# Patient Record
Sex: MALE | Race: BLACK OR AFRICAN AMERICAN | NOT HISPANIC OR LATINO
[De-identification: names, ages, dates, MRNs, and addresses within clinical notes are randomized per-mention and may not be internally consistent; named-entity substitution may affect disease eponyms.]

---

## 2017-01-16 ENCOUNTER — APPOINTMENT (OUTPATIENT)
Dept: INTERNAL MEDICINE | Facility: CLINIC | Age: 47
End: 2017-01-16

## 2017-01-25 ENCOUNTER — APPOINTMENT (OUTPATIENT)
Dept: INTERNAL MEDICINE | Facility: CLINIC | Age: 47
End: 2017-01-25

## 2017-01-25 VITALS
HEIGHT: 69 IN | OXYGEN SATURATION: 98 % | HEART RATE: 91 BPM | BODY MASS INDEX: 25.18 KG/M2 | DIASTOLIC BLOOD PRESSURE: 80 MMHG | SYSTOLIC BLOOD PRESSURE: 120 MMHG | WEIGHT: 170 LBS

## 2017-02-08 ENCOUNTER — MEDICATION RENEWAL (OUTPATIENT)
Age: 47
End: 2017-02-08

## 2017-02-11 ENCOUNTER — TRANSCRIPTION ENCOUNTER (OUTPATIENT)
Age: 47
End: 2017-02-11

## 2017-02-17 ENCOUNTER — RX RENEWAL (OUTPATIENT)
Age: 47
End: 2017-02-17

## 2017-02-21 ENCOUNTER — MED ADMIN CHARGE (OUTPATIENT)
Age: 47
End: 2017-02-21

## 2017-02-21 ENCOUNTER — MEDICATION RENEWAL (OUTPATIENT)
Age: 47
End: 2017-02-21

## 2017-03-03 ENCOUNTER — MEDICATION RENEWAL (OUTPATIENT)
Age: 47
End: 2017-03-03

## 2017-03-03 ENCOUNTER — TRANSCRIPTION ENCOUNTER (OUTPATIENT)
Age: 47
End: 2017-03-03

## 2017-03-10 ENCOUNTER — APPOINTMENT (OUTPATIENT)
Dept: INTERNAL MEDICINE | Facility: CLINIC | Age: 47
End: 2017-03-10

## 2017-03-31 ENCOUNTER — APPOINTMENT (OUTPATIENT)
Dept: INTERNAL MEDICINE | Facility: CLINIC | Age: 47
End: 2017-03-31

## 2017-03-31 VITALS
WEIGHT: 176 LBS | DIASTOLIC BLOOD PRESSURE: 80 MMHG | TEMPERATURE: 98 F | BODY MASS INDEX: 26.07 KG/M2 | SYSTOLIC BLOOD PRESSURE: 110 MMHG | HEART RATE: 82 BPM | HEIGHT: 69 IN | OXYGEN SATURATION: 98 %

## 2017-04-17 ENCOUNTER — RX RENEWAL (OUTPATIENT)
Age: 47
End: 2017-04-17

## 2017-04-28 ENCOUNTER — APPOINTMENT (OUTPATIENT)
Dept: INTERNAL MEDICINE | Facility: CLINIC | Age: 47
End: 2017-04-28

## 2017-04-28 VITALS
DIASTOLIC BLOOD PRESSURE: 70 MMHG | OXYGEN SATURATION: 97 % | WEIGHT: 174 LBS | SYSTOLIC BLOOD PRESSURE: 110 MMHG | HEIGHT: 69 IN | BODY MASS INDEX: 25.77 KG/M2 | HEART RATE: 83 BPM

## 2017-04-28 DIAGNOSIS — R10.30 LOWER ABDOMINAL PAIN, UNSPECIFIED: ICD-10-CM

## 2017-05-01 ENCOUNTER — RX RENEWAL (OUTPATIENT)
Age: 47
End: 2017-05-01

## 2017-05-02 ENCOUNTER — MEDICATION RENEWAL (OUTPATIENT)
Age: 47
End: 2017-05-02

## 2017-05-26 ENCOUNTER — APPOINTMENT (OUTPATIENT)
Dept: INTERNAL MEDICINE | Facility: CLINIC | Age: 47
End: 2017-05-26

## 2017-05-26 ENCOUNTER — OTHER (OUTPATIENT)
Age: 47
End: 2017-05-26

## 2017-05-26 VITALS
HEART RATE: 78 BPM | SYSTOLIC BLOOD PRESSURE: 120 MMHG | HEIGHT: 69 IN | BODY MASS INDEX: 25.92 KG/M2 | TEMPERATURE: 97.7 F | WEIGHT: 175 LBS | OXYGEN SATURATION: 97 % | DIASTOLIC BLOOD PRESSURE: 70 MMHG

## 2017-06-09 ENCOUNTER — APPOINTMENT (OUTPATIENT)
Dept: INTERNAL MEDICINE | Facility: CLINIC | Age: 47
End: 2017-06-09

## 2017-06-09 VITALS — BODY MASS INDEX: 25.92 KG/M2 | HEIGHT: 69 IN | WEIGHT: 175 LBS

## 2017-06-09 VITALS — DIASTOLIC BLOOD PRESSURE: 78 MMHG | HEART RATE: 68 BPM | RESPIRATION RATE: 12 BRPM | SYSTOLIC BLOOD PRESSURE: 128 MMHG

## 2017-06-17 ENCOUNTER — MEDICATION RENEWAL (OUTPATIENT)
Age: 47
End: 2017-06-17

## 2017-06-21 ENCOUNTER — APPOINTMENT (OUTPATIENT)
Dept: INTERNAL MEDICINE | Facility: CLINIC | Age: 47
End: 2017-06-21

## 2017-06-21 VITALS
OXYGEN SATURATION: 97 % | WEIGHT: 174 LBS | SYSTOLIC BLOOD PRESSURE: 130 MMHG | HEART RATE: 86 BPM | HEIGHT: 69 IN | DIASTOLIC BLOOD PRESSURE: 80 MMHG | BODY MASS INDEX: 25.77 KG/M2

## 2017-06-21 DIAGNOSIS — Z23 ENCOUNTER FOR IMMUNIZATION: ICD-10-CM

## 2017-06-21 RX ORDER — ESCITALOPRAM OXALATE 20 MG/1
20 TABLET ORAL DAILY
Qty: 30 | Refills: 2 | Status: DISCONTINUED | COMMUNITY
Start: 2017-01-25 | End: 2017-06-21

## 2017-06-22 ENCOUNTER — OTHER (OUTPATIENT)
Age: 47
End: 2017-06-22

## 2017-07-19 ENCOUNTER — APPOINTMENT (OUTPATIENT)
Dept: INTERNAL MEDICINE | Facility: CLINIC | Age: 47
End: 2017-07-19

## 2017-07-19 ENCOUNTER — OTHER (OUTPATIENT)
Age: 47
End: 2017-07-19

## 2017-07-19 ENCOUNTER — MEDICATION RENEWAL (OUTPATIENT)
Age: 47
End: 2017-07-19

## 2017-07-19 VITALS
SYSTOLIC BLOOD PRESSURE: 124 MMHG | HEART RATE: 97 BPM | DIASTOLIC BLOOD PRESSURE: 78 MMHG | OXYGEN SATURATION: 95 % | BODY MASS INDEX: 25.62 KG/M2 | HEIGHT: 69 IN | WEIGHT: 173 LBS

## 2017-08-18 ENCOUNTER — MEDICATION RENEWAL (OUTPATIENT)
Age: 47
End: 2017-08-18

## 2017-09-22 ENCOUNTER — MEDICATION RENEWAL (OUTPATIENT)
Age: 47
End: 2017-09-22

## 2017-10-18 ENCOUNTER — APPOINTMENT (OUTPATIENT)
Dept: INTERNAL MEDICINE | Facility: CLINIC | Age: 47
End: 2017-10-18
Payer: COMMERCIAL

## 2017-10-18 VITALS
DIASTOLIC BLOOD PRESSURE: 80 MMHG | TEMPERATURE: 98 F | SYSTOLIC BLOOD PRESSURE: 120 MMHG | BODY MASS INDEX: 25.18 KG/M2 | HEART RATE: 92 BPM | HEIGHT: 69 IN | OXYGEN SATURATION: 97 % | WEIGHT: 170 LBS

## 2017-10-18 PROCEDURE — 99213 OFFICE O/P EST LOW 20 MIN: CPT

## 2017-10-18 RX ORDER — ALPRAZOLAM 0.25 MG/1
0.25 TABLET ORAL
Qty: 90 | Refills: 0 | Status: COMPLETED | COMMUNITY
Start: 2017-04-28

## 2017-10-18 RX ORDER — NYSTATIN 100000 [USP'U]/G
100000 POWDER TOPICAL
Qty: 60 | Refills: 0 | Status: COMPLETED | COMMUNITY
Start: 2017-07-19

## 2017-10-26 ENCOUNTER — MEDICATION RENEWAL (OUTPATIENT)
Age: 47
End: 2017-10-26

## 2017-11-15 ENCOUNTER — APPOINTMENT (OUTPATIENT)
Dept: INTERNAL MEDICINE | Facility: CLINIC | Age: 47
End: 2017-11-15
Payer: COMMERCIAL

## 2017-11-15 VITALS
BODY MASS INDEX: 25.18 KG/M2 | OXYGEN SATURATION: 95 % | DIASTOLIC BLOOD PRESSURE: 80 MMHG | HEART RATE: 95 BPM | WEIGHT: 170 LBS | HEIGHT: 69 IN | SYSTOLIC BLOOD PRESSURE: 120 MMHG

## 2017-11-15 PROCEDURE — 99214 OFFICE O/P EST MOD 30 MIN: CPT

## 2017-11-26 ENCOUNTER — FORM ENCOUNTER (OUTPATIENT)
Age: 47
End: 2017-11-26

## 2017-11-27 ENCOUNTER — OUTPATIENT (OUTPATIENT)
Dept: OUTPATIENT SERVICES | Facility: HOSPITAL | Age: 47
LOS: 1 days | End: 2017-11-27
Payer: COMMERCIAL

## 2017-11-27 ENCOUNTER — TRANSCRIPTION ENCOUNTER (OUTPATIENT)
Age: 47
End: 2017-11-27

## 2017-11-27 ENCOUNTER — APPOINTMENT (OUTPATIENT)
Dept: RADIOLOGY | Facility: CLINIC | Age: 47
End: 2017-11-27

## 2017-11-27 DIAGNOSIS — M19.041 PRIMARY OSTEOARTHRITIS, RIGHT HAND: ICD-10-CM

## 2017-11-27 PROCEDURE — 73130 X-RAY EXAM OF HAND: CPT

## 2017-11-27 PROCEDURE — 73130 X-RAY EXAM OF HAND: CPT | Mod: 26,RT

## 2017-11-28 ENCOUNTER — MEDICATION RENEWAL (OUTPATIENT)
Age: 47
End: 2017-11-28

## 2017-12-15 ENCOUNTER — APPOINTMENT (OUTPATIENT)
Dept: INTERNAL MEDICINE | Facility: CLINIC | Age: 47
End: 2017-12-15
Payer: COMMERCIAL

## 2017-12-15 VITALS
DIASTOLIC BLOOD PRESSURE: 70 MMHG | HEART RATE: 86 BPM | OXYGEN SATURATION: 96 % | HEIGHT: 69 IN | SYSTOLIC BLOOD PRESSURE: 100 MMHG | BODY MASS INDEX: 24.44 KG/M2 | WEIGHT: 165 LBS

## 2017-12-15 PROCEDURE — 99213 OFFICE O/P EST LOW 20 MIN: CPT

## 2017-12-28 ENCOUNTER — TRANSCRIPTION ENCOUNTER (OUTPATIENT)
Age: 47
End: 2017-12-28

## 2018-01-02 ENCOUNTER — MEDICATION RENEWAL (OUTPATIENT)
Age: 48
End: 2018-01-02

## 2018-01-29 ENCOUNTER — LABORATORY RESULT (OUTPATIENT)
Age: 48
End: 2018-01-29

## 2018-01-30 ENCOUNTER — NON-APPOINTMENT (OUTPATIENT)
Age: 48
End: 2018-01-30

## 2018-01-30 ENCOUNTER — APPOINTMENT (OUTPATIENT)
Dept: INTERNAL MEDICINE | Facility: CLINIC | Age: 48
End: 2018-01-30
Payer: COMMERCIAL

## 2018-01-30 VITALS
DIASTOLIC BLOOD PRESSURE: 70 MMHG | HEART RATE: 107 BPM | WEIGHT: 161 LBS | OXYGEN SATURATION: 98 % | SYSTOLIC BLOOD PRESSURE: 130 MMHG | HEIGHT: 69 IN | BODY MASS INDEX: 23.85 KG/M2 | TEMPERATURE: 97.9 F

## 2018-01-30 PROCEDURE — 99396 PREV VISIT EST AGE 40-64: CPT | Mod: 25

## 2018-01-30 PROCEDURE — 92552 PURE TONE AUDIOMETRY AIR: CPT

## 2018-01-30 PROCEDURE — 99215 OFFICE O/P EST HI 40 MIN: CPT | Mod: 25

## 2018-01-30 PROCEDURE — 93000 ELECTROCARDIOGRAM COMPLETE: CPT

## 2018-01-30 PROCEDURE — 36415 COLL VENOUS BLD VENIPUNCTURE: CPT

## 2018-01-30 PROCEDURE — 94010 BREATHING CAPACITY TEST: CPT

## 2018-01-30 RX ORDER — SALICYLIC ACID 285 MG/ML
28.5 SOLUTION TOPICAL DAILY
Qty: 1 | Refills: 2 | Status: DISCONTINUED | COMMUNITY
Start: 2017-03-31 | End: 2018-01-30

## 2018-01-30 RX ORDER — ZOLPIDEM TARTRATE 12.5 MG/1
12.5 TABLET, EXTENDED RELEASE ORAL
Qty: 30 | Refills: 2 | Status: DISCONTINUED | COMMUNITY
Start: 2017-01-25 | End: 2018-01-30

## 2018-01-30 RX ORDER — NAPROXEN 500 MG/1
500 TABLET ORAL
Refills: 0 | Status: DISCONTINUED | COMMUNITY
End: 2018-01-30

## 2018-01-30 RX ORDER — ESCITALOPRAM OXALATE 5 MG/1
5 TABLET ORAL
Qty: 90 | Refills: 1 | Status: DISCONTINUED | COMMUNITY
Start: 2016-11-14 | End: 2018-01-30

## 2018-01-30 RX ORDER — FLUTICASONE PROPIONATE 50 UG/1
50 SPRAY, METERED NASAL DAILY
Qty: 1 | Refills: 4 | Status: DISCONTINUED | COMMUNITY
Start: 2017-06-09 | End: 2018-01-30

## 2018-01-30 RX ORDER — NYSTATIN 100MM UNIT
POWDER (EA) MISCELLANEOUS
Qty: 1 | Refills: 2 | Status: DISCONTINUED | COMMUNITY
Start: 2017-07-19 | End: 2018-01-30

## 2018-01-30 RX ORDER — ROPINIROLE 0.5 MG/1
0.5 TABLET, FILM COATED ORAL
Qty: 30 | Refills: 1 | Status: DISCONTINUED | COMMUNITY
Start: 2017-05-26 | End: 2018-01-30

## 2018-02-01 LAB
25(OH)D3 SERPL-MCNC: 30.1 NG/ML
ALBUMIN SERPL ELPH-MCNC: 4.9 G/DL
ALP BLD-CCNC: 70 U/L
ALT SERPL-CCNC: 13 U/L
ANION GAP SERPL CALC-SCNC: 15 MMOL/L
APPEARANCE: ABNORMAL
AST SERPL-CCNC: 15 U/L
BASOPHILS # BLD AUTO: 0.07 K/UL
BASOPHILS NFR BLD AUTO: 0.9 %
BILIRUB SERPL-MCNC: 0.2 MG/DL
BILIRUBIN URINE: NEGATIVE
BLOOD URINE: NEGATIVE
BUN SERPL-MCNC: 23 MG/DL
CALCIUM SERPL-MCNC: 9.5 MG/DL
CHLORIDE SERPL-SCNC: 101 MMOL/L
CHOLEST SERPL-MCNC: 209 MG/DL
CHOLEST/HDLC SERPL: 3.4 RATIO
CK SERPL-CCNC: 176 U/L
CO2 SERPL-SCNC: 25 MMOL/L
COLOR: YELLOW
CREAT SERPL-MCNC: 1.23 MG/DL
EOSINOPHIL # BLD AUTO: 0.19 K/UL
EOSINOPHIL NFR BLD AUTO: 2.5 %
GLUCOSE QUALITATIVE U: NEGATIVE MG/DL
GLUCOSE SERPL-MCNC: 100 MG/DL
HBA1C MFR BLD HPLC: 5.2 %
HCT VFR BLD CALC: 44.2 %
HDLC SERPL-MCNC: 62 MG/DL
HGB BLD-MCNC: 13.8 G/DL
HIV1+2 AB SPEC QL IA.RAPID: NONREACTIVE
IMM GRANULOCYTES NFR BLD AUTO: 0.1 %
KETONES URINE: ABNORMAL
LDLC SERPL CALC-MCNC: 131 MG/DL
LEUKOCYTE ESTERASE URINE: NEGATIVE
LYMPHOCYTES # BLD AUTO: 2.67 K/UL
LYMPHOCYTES NFR BLD AUTO: 35.4 %
MAN DIFF?: NORMAL
MCHC RBC-ENTMCNC: 27.6 PG
MCHC RBC-ENTMCNC: 31.2 GM/DL
MCV RBC AUTO: 88.4 FL
MONOCYTES # BLD AUTO: 0.59 K/UL
MONOCYTES NFR BLD AUTO: 7.8 %
NEUTROPHILS # BLD AUTO: 4.01 K/UL
NEUTROPHILS NFR BLD AUTO: 53.3 %
NITRITE URINE: NEGATIVE
PH URINE: 5
PLATELET # BLD AUTO: 262 K/UL
POTASSIUM SERPL-SCNC: 5.1 MMOL/L
PROT SERPL-MCNC: 7.7 G/DL
PROTEIN URINE: NEGATIVE MG/DL
RBC # BLD: 5 M/UL
RBC # FLD: 15.4 %
SODIUM SERPL-SCNC: 141 MMOL/L
SPECIFIC GRAVITY URINE: 1.03
TRIGL SERPL-MCNC: 80 MG/DL
TSH SERPL-ACNC: 2.35 UIU/ML
URATE SERPL-MCNC: 4.2 MG/DL
UROBILINOGEN URINE: NEGATIVE MG/DL
WBC # FLD AUTO: 7.54 K/UL

## 2018-02-13 ENCOUNTER — APPOINTMENT (OUTPATIENT)
Dept: INTERNAL MEDICINE | Facility: CLINIC | Age: 48
End: 2018-02-13
Payer: COMMERCIAL

## 2018-02-13 PROCEDURE — 99214 OFFICE O/P EST MOD 30 MIN: CPT

## 2018-02-16 ENCOUNTER — APPOINTMENT (OUTPATIENT)
Dept: ORTHOPEDIC SURGERY | Facility: CLINIC | Age: 48
End: 2018-02-16

## 2018-03-14 ENCOUNTER — TRANSCRIPTION ENCOUNTER (OUTPATIENT)
Age: 48
End: 2018-03-14

## 2018-03-15 ENCOUNTER — APPOINTMENT (OUTPATIENT)
Dept: INTERNAL MEDICINE | Facility: CLINIC | Age: 48
End: 2018-03-15
Payer: COMMERCIAL

## 2018-03-15 ENCOUNTER — MEDICATION RENEWAL (OUTPATIENT)
Age: 48
End: 2018-03-15

## 2018-03-15 VITALS
HEART RATE: 70 BPM | WEIGHT: 161 LBS | HEIGHT: 69 IN | DIASTOLIC BLOOD PRESSURE: 76 MMHG | BODY MASS INDEX: 23.85 KG/M2 | SYSTOLIC BLOOD PRESSURE: 128 MMHG

## 2018-03-15 PROCEDURE — 99213 OFFICE O/P EST LOW 20 MIN: CPT

## 2018-03-19 ENCOUNTER — MEDICATION RENEWAL (OUTPATIENT)
Age: 48
End: 2018-03-19

## 2018-04-10 ENCOUNTER — APPOINTMENT (OUTPATIENT)
Dept: INTERNAL MEDICINE | Facility: CLINIC | Age: 48
End: 2018-04-10
Payer: COMMERCIAL

## 2018-04-10 VITALS
OXYGEN SATURATION: 97 % | DIASTOLIC BLOOD PRESSURE: 78 MMHG | WEIGHT: 164 LBS | HEART RATE: 100 BPM | HEIGHT: 69 IN | SYSTOLIC BLOOD PRESSURE: 110 MMHG | BODY MASS INDEX: 24.29 KG/M2

## 2018-04-10 DIAGNOSIS — B00.2 HERPESVIRAL GINGIVOSTOMATITIS AND PHARYNGOTONSILLITIS: ICD-10-CM

## 2018-04-10 PROCEDURE — 99213 OFFICE O/P EST LOW 20 MIN: CPT

## 2018-04-28 ENCOUNTER — TRANSCRIPTION ENCOUNTER (OUTPATIENT)
Age: 48
End: 2018-04-28

## 2018-04-30 ENCOUNTER — FORM ENCOUNTER (OUTPATIENT)
Age: 48
End: 2018-04-30

## 2018-04-30 ENCOUNTER — MEDICATION RENEWAL (OUTPATIENT)
Age: 48
End: 2018-04-30

## 2018-05-01 ENCOUNTER — OUTPATIENT (OUTPATIENT)
Dept: OUTPATIENT SERVICES | Facility: HOSPITAL | Age: 48
LOS: 1 days | End: 2018-05-01
Payer: COMMERCIAL

## 2018-05-01 ENCOUNTER — APPOINTMENT (OUTPATIENT)
Dept: RADIOLOGY | Facility: CLINIC | Age: 48
End: 2018-05-01
Payer: COMMERCIAL

## 2018-05-01 ENCOUNTER — APPOINTMENT (OUTPATIENT)
Dept: INTERNAL MEDICINE | Facility: CLINIC | Age: 48
End: 2018-05-01
Payer: COMMERCIAL

## 2018-05-01 VITALS
SYSTOLIC BLOOD PRESSURE: 110 MMHG | WEIGHT: 166 LBS | DIASTOLIC BLOOD PRESSURE: 70 MMHG | HEIGHT: 69 IN | HEART RATE: 59 BPM | OXYGEN SATURATION: 97 % | BODY MASS INDEX: 24.59 KG/M2
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
## 2018-05-01 DIAGNOSIS — M62.838 OTHER MUSCLE SPASM: ICD-10-CM

## 2018-05-01 PROCEDURE — 72040 X-RAY EXAM NECK SPINE 2-3 VW: CPT

## 2018-05-01 PROCEDURE — 72114 X-RAY EXAM L-S SPINE BENDING: CPT

## 2018-05-01 PROCEDURE — 99214 OFFICE O/P EST MOD 30 MIN: CPT

## 2018-05-01 PROCEDURE — 72040 X-RAY EXAM NECK SPINE 2-3 VW: CPT | Mod: 26

## 2018-05-01 PROCEDURE — 72114 X-RAY EXAM L-S SPINE BENDING: CPT | Mod: 26

## 2018-05-15 ENCOUNTER — APPOINTMENT (OUTPATIENT)
Dept: INTERNAL MEDICINE | Facility: CLINIC | Age: 48
End: 2018-05-15

## 2018-05-22 ENCOUNTER — APPOINTMENT (OUTPATIENT)
Dept: INTERNAL MEDICINE | Facility: CLINIC | Age: 48
End: 2018-05-22
Payer: COMMERCIAL

## 2018-05-22 VITALS
HEIGHT: 69 IN | SYSTOLIC BLOOD PRESSURE: 116 MMHG | DIASTOLIC BLOOD PRESSURE: 78 MMHG | HEART RATE: 90 BPM | OXYGEN SATURATION: 98 % | WEIGHT: 166 LBS | BODY MASS INDEX: 24.59 KG/M2

## 2018-05-22 DIAGNOSIS — G56.01 CARPAL TUNNEL SYNDROME, RIGHT UPPER LIMB: ICD-10-CM

## 2018-05-22 PROCEDURE — 99214 OFFICE O/P EST MOD 30 MIN: CPT

## 2018-07-03 ENCOUNTER — APPOINTMENT (OUTPATIENT)
Dept: CT IMAGING | Facility: CLINIC | Age: 48
End: 2018-07-03
Payer: COMMERCIAL

## 2018-07-03 ENCOUNTER — OUTPATIENT (OUTPATIENT)
Dept: OUTPATIENT SERVICES | Facility: HOSPITAL | Age: 48
LOS: 1 days | End: 2018-07-03

## 2018-07-03 DIAGNOSIS — R51 HEADACHE: ICD-10-CM

## 2018-07-03 PROCEDURE — 70486 CT MAXILLOFACIAL W/O DYE: CPT | Mod: 26

## 2018-07-10 ENCOUNTER — APPOINTMENT (OUTPATIENT)
Dept: NEUROLOGY | Facility: CLINIC | Age: 48
End: 2018-07-10

## 2018-07-12 ENCOUNTER — TRANSCRIPTION ENCOUNTER (OUTPATIENT)
Age: 48
End: 2018-07-12

## 2018-07-12 ENCOUNTER — MEDICATION RENEWAL (OUTPATIENT)
Age: 48
End: 2018-07-12

## 2018-07-24 ENCOUNTER — APPOINTMENT (OUTPATIENT)
Dept: INTERNAL MEDICINE | Facility: CLINIC | Age: 48
End: 2018-07-24
Payer: COMMERCIAL

## 2018-07-24 VITALS
OXYGEN SATURATION: 99 % | HEART RATE: 99 BPM | TEMPERATURE: 98.4 F | DIASTOLIC BLOOD PRESSURE: 80 MMHG | WEIGHT: 160 LBS | BODY MASS INDEX: 23.7 KG/M2 | HEIGHT: 69 IN | SYSTOLIC BLOOD PRESSURE: 124 MMHG

## 2018-07-24 PROCEDURE — 99214 OFFICE O/P EST MOD 30 MIN: CPT

## 2018-07-24 NOTE — REVIEW OF SYSTEMS
[Recent Change In Weight] : ~T recent weight change [Vision Problems] : vision problems [Headache] : headache [Suicidal] : not suicidal [Anxiety] : anxiety

## 2018-07-24 NOTE — PHYSICAL EXAM
[No Acute Distress] : no acute distress [Well Nourished] : well nourished [Well Developed] : well developed [Normal Sclera/Conjunctiva] : normal sclera/conjunctiva [PERRL] : pupils equal round and reactive to light [Fundoscopic Exam Performed] : fundoscopic ~T exam ~C was performed [Normal Outer Ear/Nose] : the outer ears and nose were normal in appearance [Normal Oropharynx] : the oropharynx was normal [No JVD] : no jugular venous distention [No Respiratory Distress] : no respiratory distress  [Alert and Oriented x3] : oriented to person, place, and time [de-identified] : normal [de-identified] : no TMJ tenderness

## 2018-07-24 NOTE — ASSESSMENT
[FreeTextEntry1] : pt appears to have intensifying headaches and he feels better if he meditates. The headaches never completely resolve.

## 2018-07-24 NOTE — HISTORY OF PRESENT ILLNESS
[FreeTextEntry8] : cc headaches\par \par Pt reports that he developed headaches about 45 days ago. They have gotten more intense over the past 30 days. the pain is steady. There is some photophobia. He had a history of migraines, cluster several years ago. he had an eye exam within the past 6 months. the headaches are always in the same spot on the left side. The headaches have woken him up in the am. The anxiety has been coming back. He lost 11 lbs in the past month. he cannot pinpoint anything that may have brought this on. It does not prevent him falling asleep. the headaches are severe and they appear to be stable at this time. He has nausea and denies vomiting. He has no appetite. It does not feel like a band around the head. he saw an ENT and they state that the sinus was fine. he had a CT of the head for the sinuses. Mild septal deviation, no sinusitis. He state that he has been taking some Excedrin to relieve the headaches and coffee appears to help.  The headaches are on the left side, parietal lobe area. There is tenderness to touch the area.

## 2018-07-26 ENCOUNTER — FORM ENCOUNTER (OUTPATIENT)
Age: 48
End: 2018-07-26

## 2018-07-27 ENCOUNTER — OUTPATIENT (OUTPATIENT)
Dept: OUTPATIENT SERVICES | Facility: HOSPITAL | Age: 48
LOS: 1 days | End: 2018-07-27

## 2018-07-27 ENCOUNTER — APPOINTMENT (OUTPATIENT)
Dept: MRI IMAGING | Facility: CLINIC | Age: 48
End: 2018-07-27
Payer: COMMERCIAL

## 2018-07-27 DIAGNOSIS — G47.00 INSOMNIA, UNSPECIFIED: ICD-10-CM

## 2018-07-27 PROCEDURE — 70546 MR ANGIOGRAPH HEAD W/O&W/DYE: CPT | Mod: 26

## 2018-07-31 ENCOUNTER — APPOINTMENT (OUTPATIENT)
Dept: INTERNAL MEDICINE | Facility: CLINIC | Age: 48
End: 2018-07-31
Payer: COMMERCIAL

## 2018-07-31 VITALS
WEIGHT: 160 LBS | SYSTOLIC BLOOD PRESSURE: 110 MMHG | DIASTOLIC BLOOD PRESSURE: 70 MMHG | OXYGEN SATURATION: 99 % | HEIGHT: 69 IN | BODY MASS INDEX: 23.7 KG/M2 | HEART RATE: 101 BPM

## 2018-07-31 PROCEDURE — 99213 OFFICE O/P EST LOW 20 MIN: CPT

## 2018-08-20 ENCOUNTER — TRANSCRIPTION ENCOUNTER (OUTPATIENT)
Age: 48
End: 2018-08-20

## 2018-08-21 ENCOUNTER — MEDICATION RENEWAL (OUTPATIENT)
Age: 48
End: 2018-08-21

## 2018-09-13 ENCOUNTER — APPOINTMENT (OUTPATIENT)
Dept: INTERNAL MEDICINE | Facility: CLINIC | Age: 48
End: 2018-09-13
Payer: COMMERCIAL

## 2018-09-13 VITALS
WEIGHT: 170 LBS | DIASTOLIC BLOOD PRESSURE: 70 MMHG | SYSTOLIC BLOOD PRESSURE: 102 MMHG | BODY MASS INDEX: 25.18 KG/M2 | HEART RATE: 86 BPM | HEIGHT: 69 IN | OXYGEN SATURATION: 97 %

## 2018-09-13 DIAGNOSIS — M67.40 GANGLION, UNSPECIFIED SITE: ICD-10-CM
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
## 2018-09-13 PROCEDURE — 99214 OFFICE O/P EST MOD 30 MIN: CPT

## 2018-10-11 ENCOUNTER — TRANSCRIPTION ENCOUNTER (OUTPATIENT)
Age: 48
End: 2018-10-11

## 2018-10-12 ENCOUNTER — MEDICATION RENEWAL (OUTPATIENT)
Age: 48
End: 2018-10-12

## 2018-11-16 ENCOUNTER — APPOINTMENT (OUTPATIENT)
Dept: INTERNAL MEDICINE | Facility: CLINIC | Age: 48
End: 2018-11-16
Payer: COMMERCIAL

## 2018-11-16 VITALS
DIASTOLIC BLOOD PRESSURE: 78 MMHG | HEIGHT: 69 IN | SYSTOLIC BLOOD PRESSURE: 110 MMHG | HEART RATE: 97 BPM | WEIGHT: 168 LBS | BODY MASS INDEX: 24.88 KG/M2 | OXYGEN SATURATION: 98 %

## 2018-11-16 PROCEDURE — 36415 COLL VENOUS BLD VENIPUNCTURE: CPT

## 2018-11-16 PROCEDURE — 99214 OFFICE O/P EST MOD 30 MIN: CPT | Mod: 25

## 2018-11-20 ENCOUNTER — TRANSCRIPTION ENCOUNTER (OUTPATIENT)
Age: 48
End: 2018-11-20

## 2018-11-21 ENCOUNTER — MEDICATION RENEWAL (OUTPATIENT)
Age: 48
End: 2018-11-21

## 2018-11-26 LAB
TESTOST BND SERPL-MCNC: 10.7 PG/ML
TESTOST SERPL-MCNC: 1032 NG/DL

## 2018-12-26 ENCOUNTER — TRANSCRIPTION ENCOUNTER (OUTPATIENT)
Age: 48
End: 2018-12-26

## 2018-12-28 ENCOUNTER — MEDICATION RENEWAL (OUTPATIENT)
Age: 48
End: 2018-12-28

## 2018-12-30 ENCOUNTER — RX RENEWAL (OUTPATIENT)
Age: 48
End: 2018-12-30

## 2019-01-29 ENCOUNTER — TRANSCRIPTION ENCOUNTER (OUTPATIENT)
Age: 49
End: 2019-01-29

## 2019-01-30 ENCOUNTER — MEDICATION RENEWAL (OUTPATIENT)
Age: 49
End: 2019-01-30

## 2019-02-28 ENCOUNTER — TRANSCRIPTION ENCOUNTER (OUTPATIENT)
Age: 49
End: 2019-02-28

## 2019-03-04 ENCOUNTER — MEDICATION RENEWAL (OUTPATIENT)
Age: 49
End: 2019-03-04

## 2019-03-19 ENCOUNTER — NON-APPOINTMENT (OUTPATIENT)
Age: 49
End: 2019-03-19

## 2019-03-19 ENCOUNTER — APPOINTMENT (OUTPATIENT)
Dept: INTERNAL MEDICINE | Facility: CLINIC | Age: 49
End: 2019-03-19
Payer: COMMERCIAL

## 2019-03-19 VITALS
BODY MASS INDEX: 26.36 KG/M2 | HEIGHT: 69 IN | TEMPERATURE: 98.6 F | OXYGEN SATURATION: 97 % | HEART RATE: 101 BPM | SYSTOLIC BLOOD PRESSURE: 110 MMHG | DIASTOLIC BLOOD PRESSURE: 70 MMHG | WEIGHT: 178 LBS

## 2019-03-19 DIAGNOSIS — G43.909 MIGRAINE, UNSPECIFIED, NOT INTRACTABLE, W/OUT STATUS MIGRAINOSUS: ICD-10-CM

## 2019-03-19 DIAGNOSIS — K13.0 DISEASES OF LIPS: ICD-10-CM

## 2019-03-19 PROCEDURE — 93000 ELECTROCARDIOGRAM COMPLETE: CPT

## 2019-03-19 PROCEDURE — 92552 PURE TONE AUDIOMETRY AIR: CPT

## 2019-03-19 PROCEDURE — 94010 BREATHING CAPACITY TEST: CPT

## 2019-03-19 PROCEDURE — 36415 COLL VENOUS BLD VENIPUNCTURE: CPT

## 2019-03-19 PROCEDURE — 99396 PREV VISIT EST AGE 40-64: CPT | Mod: 25

## 2019-03-20 ENCOUNTER — CHART COPY (OUTPATIENT)
Age: 49
End: 2019-03-20

## 2019-03-20 ENCOUNTER — MEDICATION RENEWAL (OUTPATIENT)
Age: 49
End: 2019-03-20

## 2019-03-20 LAB
ALBUMIN SERPL ELPH-MCNC: 5 G/DL
ALP BLD-CCNC: 58 U/L
ALT SERPL-CCNC: 22 U/L
ANION GAP SERPL CALC-SCNC: 13 MMOL/L
APPEARANCE: CLEAR
AST SERPL-CCNC: 24 U/L
BASOPHILS # BLD AUTO: 0.05 K/UL
BASOPHILS NFR BLD AUTO: 1.1 %
BILIRUB SERPL-MCNC: 0.4 MG/DL
BILIRUBIN URINE: NEGATIVE
BLOOD URINE: NEGATIVE
BUN SERPL-MCNC: 17 MG/DL
CALCIUM SERPL-MCNC: 10.2 MG/DL
CHLORIDE SERPL-SCNC: 100 MMOL/L
CHOLEST SERPL-MCNC: 254 MG/DL
CHOLEST/HDLC SERPL: 2.9 RATIO
CK SERPL-CCNC: 292 U/L
CO2 SERPL-SCNC: 28 MMOL/L
COLOR: YELLOW
CREAT SERPL-MCNC: 1.17 MG/DL
EOSINOPHIL # BLD AUTO: 0.11 K/UL
EOSINOPHIL NFR BLD AUTO: 2.5 %
GLUCOSE QUALITATIVE U: NEGATIVE
GLUCOSE SERPL-MCNC: 87 MG/DL
HBA1C MFR BLD HPLC: 5.1 %
HCT VFR BLD CALC: 43.9 %
HDLC SERPL-MCNC: 88 MG/DL
HGB BLD-MCNC: 13.7 G/DL
IMM GRANULOCYTES NFR BLD AUTO: 0.2 %
KETONES URINE: NORMAL
LDLC SERPL CALC-MCNC: 155 MG/DL
LEUKOCYTE ESTERASE URINE: NEGATIVE
LYMPHOCYTES # BLD AUTO: 1.67 K/UL
LYMPHOCYTES NFR BLD AUTO: 37.9 %
MAN DIFF?: NORMAL
MCHC RBC-ENTMCNC: 26.7 PG
MCHC RBC-ENTMCNC: 31.2 GM/DL
MCV RBC AUTO: 85.4 FL
MONOCYTES # BLD AUTO: 0.55 K/UL
MONOCYTES NFR BLD AUTO: 12.5 %
NEUTROPHILS # BLD AUTO: 2.02 K/UL
NEUTROPHILS NFR BLD AUTO: 45.8 %
NITRITE URINE: NEGATIVE
PH URINE: 6.5
PLATELET # BLD AUTO: 277 K/UL
POTASSIUM SERPL-SCNC: 5.3 MMOL/L
PROT SERPL-MCNC: 7.5 G/DL
PROTEIN URINE: NORMAL
RBC # BLD: 5.14 M/UL
RBC # FLD: 14.5 %
SODIUM SERPL-SCNC: 141 MMOL/L
SPECIFIC GRAVITY URINE: 1.03
TRIGL SERPL-MCNC: 55 MG/DL
TSH SERPL-ACNC: 1.41 UIU/ML
URATE SERPL-MCNC: 5.1 MG/DL
UROBILINOGEN URINE: NORMAL
WBC # FLD AUTO: 4.41 K/UL

## 2019-03-22 LAB — 25(OH)D3 SERPL-MCNC: 32.8 NG/ML

## 2019-03-27 ENCOUNTER — MEDICATION RENEWAL (OUTPATIENT)
Age: 49
End: 2019-03-27

## 2019-04-01 ENCOUNTER — APPOINTMENT (OUTPATIENT)
Dept: INTERNAL MEDICINE | Facility: CLINIC | Age: 49
End: 2019-04-01
Payer: COMMERCIAL

## 2019-04-01 VITALS
SYSTOLIC BLOOD PRESSURE: 122 MMHG | DIASTOLIC BLOOD PRESSURE: 82 MMHG | HEART RATE: 87 BPM | HEIGHT: 69 IN | OXYGEN SATURATION: 97 %

## 2019-04-01 PROCEDURE — 99213 OFFICE O/P EST LOW 20 MIN: CPT

## 2019-04-01 RX ORDER — ALPRAZOLAM 0.5 MG/1
0.5 TABLET ORAL
Qty: 60 | Refills: 0 | Status: DISCONTINUED | OUTPATIENT
Start: 2017-01-25 | End: 2019-04-01

## 2019-04-03 ENCOUNTER — APPOINTMENT (OUTPATIENT)
Dept: INTERNAL MEDICINE | Facility: CLINIC | Age: 49
End: 2019-04-03

## 2019-04-10 ENCOUNTER — APPOINTMENT (OUTPATIENT)
Dept: INTERNAL MEDICINE | Facility: CLINIC | Age: 49
End: 2019-04-10

## 2019-04-15 ENCOUNTER — APPOINTMENT (OUTPATIENT)
Dept: INTERNAL MEDICINE | Facility: CLINIC | Age: 49
End: 2019-04-15
Payer: COMMERCIAL

## 2019-04-15 VITALS
HEART RATE: 95 BPM | WEIGHT: 176 LBS | DIASTOLIC BLOOD PRESSURE: 80 MMHG | BODY MASS INDEX: 26.07 KG/M2 | HEIGHT: 69 IN | SYSTOLIC BLOOD PRESSURE: 120 MMHG | OXYGEN SATURATION: 97 %

## 2019-04-15 PROCEDURE — 99214 OFFICE O/P EST MOD 30 MIN: CPT

## 2019-04-17 ENCOUNTER — APPOINTMENT (OUTPATIENT)
Dept: INTERNAL MEDICINE | Facility: CLINIC | Age: 49
End: 2019-04-17

## 2019-04-24 ENCOUNTER — APPOINTMENT (OUTPATIENT)
Dept: INTERNAL MEDICINE | Facility: CLINIC | Age: 49
End: 2019-04-24

## 2019-04-26 ENCOUNTER — APPOINTMENT (OUTPATIENT)
Dept: INTERNAL MEDICINE | Facility: CLINIC | Age: 49
End: 2019-04-26

## 2019-05-01 ENCOUNTER — MEDICATION RENEWAL (OUTPATIENT)
Age: 49
End: 2019-05-01

## 2019-05-03 ENCOUNTER — RX RENEWAL (OUTPATIENT)
Age: 49
End: 2019-05-03

## 2019-05-08 ENCOUNTER — APPOINTMENT (OUTPATIENT)
Dept: INTERNAL MEDICINE | Facility: CLINIC | Age: 49
End: 2019-05-08

## 2019-05-20 ENCOUNTER — OTHER (OUTPATIENT)
Age: 49
End: 2019-05-20

## 2019-05-20 ENCOUNTER — APPOINTMENT (OUTPATIENT)
Dept: INTERNAL MEDICINE | Facility: CLINIC | Age: 49
End: 2019-05-20
Payer: COMMERCIAL

## 2019-05-20 VITALS
SYSTOLIC BLOOD PRESSURE: 132 MMHG | HEIGHT: 69 IN | DIASTOLIC BLOOD PRESSURE: 80 MMHG | OXYGEN SATURATION: 97 % | HEART RATE: 101 BPM | BODY MASS INDEX: 25.92 KG/M2 | WEIGHT: 175 LBS

## 2019-05-20 DIAGNOSIS — J30.9 ALLERGIC RHINITIS, UNSPECIFIED: ICD-10-CM

## 2019-05-20 PROCEDURE — 99214 OFFICE O/P EST MOD 30 MIN: CPT

## 2019-06-05 ENCOUNTER — TRANSCRIPTION ENCOUNTER (OUTPATIENT)
Age: 49
End: 2019-06-05

## 2019-06-07 ENCOUNTER — CHART COPY (OUTPATIENT)
Age: 49
End: 2019-06-07

## 2019-06-07 ENCOUNTER — APPOINTMENT (OUTPATIENT)
Dept: INTERNAL MEDICINE | Facility: CLINIC | Age: 49
End: 2019-06-07
Payer: COMMERCIAL

## 2019-06-07 VITALS
HEART RATE: 95 BPM | DIASTOLIC BLOOD PRESSURE: 80 MMHG | BODY MASS INDEX: 26.81 KG/M2 | HEIGHT: 69 IN | OXYGEN SATURATION: 97 % | SYSTOLIC BLOOD PRESSURE: 134 MMHG | WEIGHT: 181 LBS | TEMPERATURE: 98.4 F

## 2019-06-07 PROCEDURE — 99214 OFFICE O/P EST MOD 30 MIN: CPT

## 2019-06-07 NOTE — PHYSICAL EXAM
[No Acute Distress] : no acute distress [Normal Sclera/Conjunctiva] : normal sclera/conjunctiva [Well Nourished] : well nourished [No JVD] : no jugular venous distention [Supple] : supple [No Respiratory Distress] : no respiratory distress  [Clear to Auscultation] : lungs were clear to auscultation bilaterally [Normal Rate] : normal rate  [Alert and Oriented x3] : oriented to person, place, and time [de-identified] : Pale boggy and swollen mucosa with clear coryza

## 2019-06-07 NOTE — HISTORY OF PRESENT ILLNESS
[FreeTextEntry1] : sore throat [de-identified] : Pt reports that he had a sore throat Monday am and he had swelling in the throat and could not swallow. he does reports that he had some crabs before the incident and has not had a problem like this before. he felt that he razor blades in the throat. he states that the prednisone does help. Nothing made it worse. he is getting better at this time. He did have crabs again and did not have a problem. he has been swallowing better and gargling with viscous xylocaine has helped. He denies fever or chills. He has had some nasal congestion. No cough. he had trouble swallowing water and it would go down after that.

## 2019-06-18 ENCOUNTER — APPOINTMENT (OUTPATIENT)
Dept: INTERNAL MEDICINE | Facility: CLINIC | Age: 49
End: 2019-06-18
Payer: COMMERCIAL

## 2019-06-18 VITALS
BODY MASS INDEX: 26.81 KG/M2 | WEIGHT: 181 LBS | DIASTOLIC BLOOD PRESSURE: 80 MMHG | HEART RATE: 81 BPM | OXYGEN SATURATION: 98 % | HEIGHT: 69 IN | SYSTOLIC BLOOD PRESSURE: 110 MMHG

## 2019-06-18 DIAGNOSIS — M25.422 EFFUSION, LEFT ELBOW: ICD-10-CM

## 2019-06-18 DIAGNOSIS — J32.9 CHRONIC SINUSITIS, UNSPECIFIED: ICD-10-CM

## 2019-06-18 PROCEDURE — 99214 OFFICE O/P EST MOD 30 MIN: CPT

## 2019-06-28 ENCOUNTER — APPOINTMENT (OUTPATIENT)
Dept: INTERNAL MEDICINE | Facility: CLINIC | Age: 49
End: 2019-06-28

## 2019-06-30 PROCEDURE — 99484 CARE MGMT SVC BHVL HLTH COND: CPT

## 2019-07-17 ENCOUNTER — APPOINTMENT (OUTPATIENT)
Dept: INTERNAL MEDICINE | Facility: CLINIC | Age: 49
End: 2019-07-17
Payer: COMMERCIAL

## 2019-07-17 VITALS
HEART RATE: 88 BPM | BODY MASS INDEX: 27.25 KG/M2 | WEIGHT: 184 LBS | SYSTOLIC BLOOD PRESSURE: 130 MMHG | DIASTOLIC BLOOD PRESSURE: 90 MMHG | HEIGHT: 69 IN | OXYGEN SATURATION: 98 %

## 2019-07-17 DIAGNOSIS — Z87.09 PERSONAL HISTORY OF OTHER DISEASES OF THE RESPIRATORY SYSTEM: ICD-10-CM

## 2019-07-17 PROCEDURE — 99214 OFFICE O/P EST MOD 30 MIN: CPT

## 2019-07-22 ENCOUNTER — RX RENEWAL (OUTPATIENT)
Age: 49
End: 2019-07-22

## 2019-08-16 ENCOUNTER — MEDICATION RENEWAL (OUTPATIENT)
Age: 49
End: 2019-08-16

## 2019-09-16 ENCOUNTER — MEDICATION RENEWAL (OUTPATIENT)
Age: 49
End: 2019-09-16

## 2019-09-17 ENCOUNTER — RX RENEWAL (OUTPATIENT)
Age: 49
End: 2019-09-17

## 2019-09-20 ENCOUNTER — APPOINTMENT (OUTPATIENT)
Dept: INTERNAL MEDICINE | Facility: CLINIC | Age: 49
End: 2019-09-20
Payer: COMMERCIAL

## 2019-09-20 VITALS
BODY MASS INDEX: 27.85 KG/M2 | HEART RATE: 91 BPM | OXYGEN SATURATION: 97 % | WEIGHT: 188 LBS | HEIGHT: 69 IN | SYSTOLIC BLOOD PRESSURE: 130 MMHG | DIASTOLIC BLOOD PRESSURE: 80 MMHG

## 2019-09-20 PROCEDURE — 99213 OFFICE O/P EST LOW 20 MIN: CPT

## 2019-10-16 ENCOUNTER — MEDICATION RENEWAL (OUTPATIENT)
Age: 49
End: 2019-10-16

## 2019-11-16 ENCOUNTER — MEDICATION RENEWAL (OUTPATIENT)
Age: 49
End: 2019-11-16

## 2019-12-16 ENCOUNTER — MEDICATION RENEWAL (OUTPATIENT)
Age: 49
End: 2019-12-16

## 2019-12-20 ENCOUNTER — APPOINTMENT (OUTPATIENT)
Dept: INTERNAL MEDICINE | Facility: CLINIC | Age: 49
End: 2019-12-20

## 2020-01-02 ENCOUNTER — MEDICATION RENEWAL (OUTPATIENT)
Age: 50
End: 2020-01-02

## 2020-01-28 ENCOUNTER — APPOINTMENT (OUTPATIENT)
Dept: INTERNAL MEDICINE | Facility: CLINIC | Age: 50
End: 2020-01-28

## 2020-02-18 ENCOUNTER — APPOINTMENT (OUTPATIENT)
Dept: INTERNAL MEDICINE | Facility: CLINIC | Age: 50
End: 2020-02-18
Payer: COMMERCIAL

## 2020-02-18 ENCOUNTER — RX RENEWAL (OUTPATIENT)
Age: 50
End: 2020-02-18

## 2020-02-18 VITALS
WEIGHT: 178 LBS | RESPIRATION RATE: 16 BRPM | TEMPERATURE: 98.3 F | DIASTOLIC BLOOD PRESSURE: 80 MMHG | SYSTOLIC BLOOD PRESSURE: 110 MMHG | BODY MASS INDEX: 26.29 KG/M2 | HEART RATE: 88 BPM | OXYGEN SATURATION: 97 %

## 2020-02-18 PROCEDURE — 99214 OFFICE O/P EST MOD 30 MIN: CPT

## 2020-02-18 RX ORDER — LIDOCAINE HYDROCHLORIDE 20 MG/ML
2 SOLUTION OROPHARYNGEAL
Qty: 1 | Refills: 0 | Status: DISCONTINUED | COMMUNITY
Start: 2018-03-19 | End: 2020-02-18

## 2020-02-18 RX ORDER — ELETRIPTAN HYDROBROMIDE 40 MG/1
40 TABLET, FILM COATED ORAL
Qty: 1 | Refills: 2 | Status: DISCONTINUED | COMMUNITY
Start: 2018-07-24 | End: 2020-02-18

## 2020-02-18 RX ORDER — VENLAFAXINE HYDROCHLORIDE 75 MG/1
75 CAPSULE, EXTENDED RELEASE ORAL DAILY
Qty: 30 | Refills: 2 | Status: DISCONTINUED | COMMUNITY
Start: 2018-01-30 | End: 2020-02-18

## 2020-02-18 RX ORDER — NAPROXEN 500 MG/1
500 TABLET ORAL
Qty: 60 | Refills: 2 | Status: DISCONTINUED | COMMUNITY
Start: 2018-05-01 | End: 2020-02-18

## 2020-02-18 RX ORDER — CYCLOBENZAPRINE HYDROCHLORIDE 10 MG/1
10 TABLET, FILM COATED ORAL 3 TIMES DAILY
Qty: 30 | Refills: 2 | Status: DISCONTINUED | COMMUNITY
Start: 2018-05-01 | End: 2020-02-18

## 2020-02-18 RX ORDER — PREDNISONE 5 MG/1
5 TABLET ORAL
Qty: 36 | Refills: 1 | Status: DISCONTINUED | COMMUNITY
Start: 2019-06-07 | End: 2020-02-18

## 2020-03-02 ENCOUNTER — APPOINTMENT (OUTPATIENT)
Dept: INTERNAL MEDICINE | Facility: CLINIC | Age: 50
End: 2020-03-02

## 2020-04-17 ENCOUNTER — APPOINTMENT (OUTPATIENT)
Dept: INTERNAL MEDICINE | Facility: CLINIC | Age: 50
End: 2020-04-17

## 2020-04-24 ENCOUNTER — APPOINTMENT (OUTPATIENT)
Dept: INTERNAL MEDICINE | Facility: CLINIC | Age: 50
End: 2020-04-24
Payer: COMMERCIAL

## 2020-04-24 ENCOUNTER — APPOINTMENT (OUTPATIENT)
Dept: INTERNAL MEDICINE | Facility: CLINIC | Age: 50
End: 2020-04-24

## 2020-04-24 PROCEDURE — 99442: CPT

## 2020-07-20 ENCOUNTER — APPOINTMENT (OUTPATIENT)
Dept: INTERNAL MEDICINE | Facility: CLINIC | Age: 50
End: 2020-07-20
Payer: COMMERCIAL

## 2020-07-20 ENCOUNTER — NON-APPOINTMENT (OUTPATIENT)
Age: 50
End: 2020-07-20

## 2020-07-20 VITALS
WEIGHT: 186 LBS | HEIGHT: 69 IN | TEMPERATURE: 98 F | SYSTOLIC BLOOD PRESSURE: 120 MMHG | HEART RATE: 75 BPM | OXYGEN SATURATION: 98 % | DIASTOLIC BLOOD PRESSURE: 80 MMHG | BODY MASS INDEX: 27.55 KG/M2

## 2020-07-20 DIAGNOSIS — M19.041 PRIMARY OSTEOARTHRITIS, RIGHT HAND: ICD-10-CM

## 2020-07-20 DIAGNOSIS — Z11.59 ENCOUNTER FOR SCREENING FOR OTHER VIRAL DISEASES: ICD-10-CM

## 2020-07-20 PROCEDURE — 99396 PREV VISIT EST AGE 40-64: CPT | Mod: 25

## 2020-07-20 PROCEDURE — 36415 COLL VENOUS BLD VENIPUNCTURE: CPT

## 2020-07-20 PROCEDURE — 93000 ELECTROCARDIOGRAM COMPLETE: CPT

## 2020-07-20 PROCEDURE — 92552 PURE TONE AUDIOMETRY AIR: CPT

## 2020-07-20 PROCEDURE — 99213 OFFICE O/P EST LOW 20 MIN: CPT | Mod: 25

## 2020-07-21 LAB
24R-OH-CALCIDIOL SERPL-MCNC: 53.7 PG/ML
ALBUMIN SERPL ELPH-MCNC: 4.8 G/DL
ALP BLD-CCNC: 62 U/L
ALT SERPL-CCNC: 15 U/L
ANION GAP SERPL CALC-SCNC: 11 MMOL/L
APPEARANCE: CLEAR
AST SERPL-CCNC: 17 U/L
BASOPHILS # BLD AUTO: 0.06 K/UL
BASOPHILS NFR BLD AUTO: 1.4 %
BILIRUB SERPL-MCNC: 0.3 MG/DL
BILIRUBIN URINE: NEGATIVE
BLOOD URINE: NEGATIVE
BUN SERPL-MCNC: 26 MG/DL
CALCIUM SERPL-MCNC: 9.6 MG/DL
CHLORIDE SERPL-SCNC: 102 MMOL/L
CHOLEST SERPL-MCNC: 213 MG/DL
CHOLEST/HDLC SERPL: 2.8 RATIO
CK SERPL-CCNC: 192 U/L
CO2 SERPL-SCNC: 29 MMOL/L
COLOR: YELLOW
CREAT SERPL-MCNC: 1.08 MG/DL
CREAT SPEC-SCNC: 270 MG/DL
EOSINOPHIL # BLD AUTO: 0.21 K/UL
EOSINOPHIL NFR BLD AUTO: 5 %
ESTIMATED AVERAGE GLUCOSE: 105 MG/DL
GLUCOSE QUALITATIVE U: NEGATIVE
GLUCOSE SERPL-MCNC: 98 MG/DL
HBA1C MFR BLD HPLC: 5.3 %
HCT VFR BLD CALC: 45.6 %
HDLC SERPL-MCNC: 75 MG/DL
HGB BLD-MCNC: 13.7 G/DL
IMM GRANULOCYTES NFR BLD AUTO: 0.2 %
KETONES URINE: NEGATIVE
LDLC SERPL CALC-MCNC: 124 MG/DL
LEUKOCYTE ESTERASE URINE: NEGATIVE
LYMPHOCYTES # BLD AUTO: 1.32 K/UL
LYMPHOCYTES NFR BLD AUTO: 31.7 %
MAN DIFF?: NORMAL
MCHC RBC-ENTMCNC: 26.6 PG
MCHC RBC-ENTMCNC: 30 GM/DL
MCV RBC AUTO: 88.4 FL
MICROALBUMIN 24H UR DL<=1MG/L-MCNC: <1.2 MG/DL
MICROALBUMIN/CREAT 24H UR-RTO: NORMAL MG/G
MONOCYTES # BLD AUTO: 0.43 K/UL
MONOCYTES NFR BLD AUTO: 10.3 %
NEUTROPHILS # BLD AUTO: 2.14 K/UL
NEUTROPHILS NFR BLD AUTO: 51.4 %
NITRITE URINE: NEGATIVE
PH URINE: 6.5
PLATELET # BLD AUTO: 249 K/UL
POTASSIUM SERPL-SCNC: 5.1 MMOL/L
PROT SERPL-MCNC: 7.3 G/DL
PROTEIN URINE: NORMAL
RBC # BLD: 5.16 M/UL
RBC # FLD: 15.1 %
SARS-COV-2 IGG SERPL IA-ACNC: <0.1 INDEX
SARS-COV-2 IGG SERPL QL IA: NEGATIVE
SODIUM SERPL-SCNC: 142 MMOL/L
SPECIFIC GRAVITY URINE: 1.04
TRIGL SERPL-MCNC: 66 MG/DL
TSH SERPL-ACNC: 1.66 UIU/ML
URATE SERPL-MCNC: 4.6 MG/DL
UROBILINOGEN URINE: NORMAL
WBC # FLD AUTO: 4.17 K/UL

## 2020-07-22 LAB — HIV1+2 AB SPEC QL IA.RAPID: NONREACTIVE

## 2020-10-19 ENCOUNTER — APPOINTMENT (OUTPATIENT)
Dept: INTERNAL MEDICINE | Facility: CLINIC | Age: 50
End: 2020-10-19

## 2020-11-04 ENCOUNTER — APPOINTMENT (OUTPATIENT)
Dept: INTERNAL MEDICINE | Facility: CLINIC | Age: 50
End: 2020-11-04
Payer: COMMERCIAL

## 2020-11-04 PROCEDURE — 99214 OFFICE O/P EST MOD 30 MIN: CPT | Mod: 95

## 2020-12-08 ENCOUNTER — APPOINTMENT (OUTPATIENT)
Dept: INTERNAL MEDICINE | Facility: CLINIC | Age: 50
End: 2020-12-08
Payer: COMMERCIAL

## 2020-12-08 VITALS
DIASTOLIC BLOOD PRESSURE: 70 MMHG | WEIGHT: 203 LBS | HEIGHT: 69 IN | HEART RATE: 82 BPM | TEMPERATURE: 97.8 F | BODY MASS INDEX: 30.07 KG/M2 | SYSTOLIC BLOOD PRESSURE: 110 MMHG | OXYGEN SATURATION: 98 %

## 2020-12-08 DIAGNOSIS — Z78.9 OTHER SPECIFIED HEALTH STATUS: ICD-10-CM

## 2020-12-08 DIAGNOSIS — Z86.19 PERSONAL HISTORY OF OTHER INFECTIOUS AND PARASITIC DISEASES: ICD-10-CM

## 2020-12-08 PROCEDURE — 99072 ADDL SUPL MATRL&STAF TM PHE: CPT

## 2020-12-08 PROCEDURE — 99214 OFFICE O/P EST MOD 30 MIN: CPT

## 2020-12-29 ENCOUNTER — APPOINTMENT (OUTPATIENT)
Dept: INTERNAL MEDICINE | Facility: CLINIC | Age: 50
End: 2020-12-29
Payer: COMMERCIAL

## 2020-12-29 PROCEDURE — 99214 OFFICE O/P EST MOD 30 MIN: CPT | Mod: 95

## 2021-02-09 ENCOUNTER — APPOINTMENT (OUTPATIENT)
Dept: INTERNAL MEDICINE | Facility: CLINIC | Age: 51
End: 2021-02-09
Payer: COMMERCIAL

## 2021-02-09 PROCEDURE — 99214 OFFICE O/P EST MOD 30 MIN: CPT | Mod: 95

## 2021-02-28 ENCOUNTER — TRANSCRIPTION ENCOUNTER (OUTPATIENT)
Age: 51
End: 2021-02-28

## 2021-03-01 ENCOUNTER — RX RENEWAL (OUTPATIENT)
Age: 51
End: 2021-03-01

## 2021-03-01 ENCOUNTER — NON-APPOINTMENT (OUTPATIENT)
Age: 51
End: 2021-03-01

## 2021-03-28 ENCOUNTER — APPOINTMENT (OUTPATIENT)
Dept: DISASTER EMERGENCY | Facility: OTHER | Age: 51
End: 2021-03-28
Payer: COMMERCIAL

## 2021-03-28 PROCEDURE — 0011A: CPT

## 2021-04-20 ENCOUNTER — APPOINTMENT (OUTPATIENT)
Dept: INTERNAL MEDICINE | Facility: CLINIC | Age: 51
End: 2021-04-20
Payer: COMMERCIAL

## 2021-04-20 DIAGNOSIS — G47.00 INSOMNIA, UNSPECIFIED: ICD-10-CM

## 2021-04-20 PROCEDURE — 99214 OFFICE O/P EST MOD 30 MIN: CPT | Mod: 95

## 2021-04-25 ENCOUNTER — APPOINTMENT (OUTPATIENT)
Dept: DISASTER EMERGENCY | Facility: OTHER | Age: 51
End: 2021-04-25

## 2021-04-28 ENCOUNTER — APPOINTMENT (OUTPATIENT)
Dept: DISASTER EMERGENCY | Facility: OTHER | Age: 51
End: 2021-04-28
Payer: COMMERCIAL

## 2021-04-28 PROCEDURE — 0012A: CPT

## 2021-06-02 ENCOUNTER — RX RENEWAL (OUTPATIENT)
Age: 51
End: 2021-06-02

## 2021-06-28 ENCOUNTER — TRANSCRIPTION ENCOUNTER (OUTPATIENT)
Age: 51
End: 2021-06-28

## 2021-07-19 ENCOUNTER — APPOINTMENT (OUTPATIENT)
Dept: INTERNAL MEDICINE | Facility: CLINIC | Age: 51
End: 2021-07-19

## 2021-10-15 ENCOUNTER — APPOINTMENT (OUTPATIENT)
Dept: INTERNAL MEDICINE | Facility: CLINIC | Age: 51
End: 2021-10-15
Payer: COMMERCIAL

## 2021-10-15 ENCOUNTER — LABORATORY RESULT (OUTPATIENT)
Age: 51
End: 2021-10-15

## 2021-10-15 ENCOUNTER — NON-APPOINTMENT (OUTPATIENT)
Age: 51
End: 2021-10-15

## 2021-10-15 VITALS
TEMPERATURE: 98.3 F | SYSTOLIC BLOOD PRESSURE: 100 MMHG | HEART RATE: 82 BPM | OXYGEN SATURATION: 97 % | WEIGHT: 219 LBS | DIASTOLIC BLOOD PRESSURE: 78 MMHG | BODY MASS INDEX: 32.44 KG/M2 | HEIGHT: 69 IN

## 2021-10-15 DIAGNOSIS — Z23 ENCOUNTER FOR IMMUNIZATION: ICD-10-CM

## 2021-10-15 DIAGNOSIS — N52.9 MALE ERECTILE DYSFUNCTION, UNSPECIFIED: ICD-10-CM

## 2021-10-15 DIAGNOSIS — F41.9 ANXIETY DISORDER, UNSPECIFIED: ICD-10-CM

## 2021-10-15 PROCEDURE — 90686 IIV4 VACC NO PRSV 0.5 ML IM: CPT

## 2021-10-15 PROCEDURE — 93000 ELECTROCARDIOGRAM COMPLETE: CPT

## 2021-10-15 PROCEDURE — G0008: CPT

## 2021-10-15 PROCEDURE — 36415 COLL VENOUS BLD VENIPUNCTURE: CPT

## 2021-10-15 PROCEDURE — 99396 PREV VISIT EST AGE 40-64: CPT | Mod: 25

## 2021-10-18 ENCOUNTER — NON-APPOINTMENT (OUTPATIENT)
Age: 51
End: 2021-10-18

## 2021-10-18 LAB
24R-OH-CALCIDIOL SERPL-MCNC: 52.7 PG/ML
ALBUMIN SERPL ELPH-MCNC: 4.9 G/DL
ALP BLD-CCNC: 81 U/L
ALT SERPL-CCNC: 15 U/L
ANION GAP SERPL CALC-SCNC: 15 MMOL/L
APPEARANCE: ABNORMAL
AST SERPL-CCNC: 23 U/L
BASOPHILS # BLD AUTO: 0.04 K/UL
BASOPHILS NFR BLD AUTO: 0.6 %
BILIRUB SERPL-MCNC: <0.2 MG/DL
BILIRUBIN URINE: NEGATIVE
BLOOD URINE: NEGATIVE
BUN SERPL-MCNC: 18 MG/DL
CALCIUM SERPL-MCNC: 9.6 MG/DL
CHLORIDE SERPL-SCNC: 101 MMOL/L
CHOLEST SERPL-MCNC: 277 MG/DL
CK SERPL-CCNC: 621 U/L
CO2 SERPL-SCNC: 24 MMOL/L
COLOR: YELLOW
CREAT SERPL-MCNC: 1.23 MG/DL
CREAT SPEC-SCNC: 334 MG/DL
EOSINOPHIL # BLD AUTO: 0.15 K/UL
EOSINOPHIL NFR BLD AUTO: 2.4 %
ESTIMATED AVERAGE GLUCOSE: 117 MG/DL
GLUCOSE QUALITATIVE U: NEGATIVE
GLUCOSE SERPL-MCNC: 98 MG/DL
HBA1C MFR BLD HPLC: 5.7 %
HCT VFR BLD CALC: 46.8 %
HCV AB SER QL: NONREACTIVE
HCV S/CO RATIO: 0.13 S/CO
HDLC SERPL-MCNC: 67 MG/DL
HGB BLD-MCNC: 14.3 G/DL
IMM GRANULOCYTES NFR BLD AUTO: 0.3 %
KETONES URINE: NORMAL
LDLC SERPL CALC-MCNC: 158 MG/DL
LEUKOCYTE ESTERASE URINE: NEGATIVE
LYMPHOCYTES # BLD AUTO: 1.75 K/UL
LYMPHOCYTES NFR BLD AUTO: 28.2 %
MAN DIFF?: NORMAL
MCHC RBC-ENTMCNC: 27.2 PG
MCHC RBC-ENTMCNC: 30.6 GM/DL
MCV RBC AUTO: 89 FL
MICROALBUMIN 24H UR DL<=1MG/L-MCNC: 1.4 MG/DL
MICROALBUMIN/CREAT 24H UR-RTO: 4 MG/G
MONOCYTES # BLD AUTO: 0.61 K/UL
MONOCYTES NFR BLD AUTO: 9.8 %
NEUTROPHILS # BLD AUTO: 3.64 K/UL
NEUTROPHILS NFR BLD AUTO: 58.7 %
NITRITE URINE: NEGATIVE
NONHDLC SERPL-MCNC: 210 MG/DL
PH URINE: 5.5
PLATELET # BLD AUTO: 284 K/UL
POTASSIUM SERPL-SCNC: 5 MMOL/L
PROT SERPL-MCNC: 7.9 G/DL
PROTEIN URINE: NORMAL
RBC # BLD: 5.26 M/UL
RBC # FLD: 14.3 %
SARS-COV-2 N GENE NPH QL NAA+PROBE: NOT DETECTED
SODIUM SERPL-SCNC: 140 MMOL/L
SPECIFIC GRAVITY URINE: 1.03
TRIGL SERPL-MCNC: 259 MG/DL
TSH SERPL-ACNC: 1.96 UIU/ML
URATE SERPL-MCNC: 7 MG/DL
UROBILINOGEN URINE: NORMAL
WBC # FLD AUTO: 6.21 K/UL

## 2021-12-14 LAB — HEMOCCULT STL QL IA: NEGATIVE

## 2021-12-17 RX ORDER — CLONAZEPAM 2 MG/1
2 TABLET ORAL TWICE DAILY
Qty: 90 | Refills: 0 | Status: ACTIVE | COMMUNITY
Start: 2019-04-01 | End: 1900-01-01

## 2022-01-04 ENCOUNTER — APPOINTMENT (OUTPATIENT)
Dept: INTERNAL MEDICINE | Facility: CLINIC | Age: 52
End: 2022-01-04

## 2022-01-14 ENCOUNTER — APPOINTMENT (OUTPATIENT)
Dept: INTERNAL MEDICINE | Facility: CLINIC | Age: 52
End: 2022-01-14

## 2022-04-11 PROBLEM — Z11.59 SCREENING FOR VIRAL DISEASE: Status: ACTIVE | Noted: 2020-07-20

## 2022-04-14 ENCOUNTER — APPOINTMENT (OUTPATIENT)
Dept: ORTHOPEDIC SURGERY | Facility: CLINIC | Age: 52
End: 2022-04-14
Payer: OTHER MISCELLANEOUS

## 2022-04-14 VITALS — WEIGHT: 200 LBS | BODY MASS INDEX: 29.62 KG/M2 | HEIGHT: 69 IN

## 2022-04-14 PROCEDURE — 99072 ADDL SUPL MATRL&STAF TM PHE: CPT

## 2022-04-14 PROCEDURE — 99213 OFFICE O/P EST LOW 20 MIN: CPT

## 2022-04-14 NOTE — HISTORY OF PRESENT ILLNESS
[Left Leg] : left leg [Work related] : work related [Sudden] : sudden [0] : 0 [Physical therapy] : physical therapy [de-identified] : WC 10/18/21: Patient returns for his LT calf strain. WB in supportive footwear. He reports symptoms have resolved. He has started a new IT position.  [] : no [FreeTextEntry9] : started therapy exercises at gym [FreeTextEntry3] : 10-18-21 [de-identified] : HEP

## 2022-04-14 NOTE — PHYSICAL EXAM
[Left] : left foot and ankle [NL (40)] : plantar flexion 40 degrees [NL 30)] : inversion 30 degrees [NL (20)] : eversion 20 degrees [5___] : [facility] 5[unfilled]/5 [2+] : posterior tibialis pulse: 2+ [Normal] : saphenous nerve sensation normal [] : non-antalgic

## 2022-04-14 NOTE — WORK
[Sprain/Strain] : sprain/strain [Was the competent medical cause of the injury] : was the competent medical cause of the injury [Are consistent with the injury] : are consistent with the injury [Consistent with my objective findings] : consistent with my objective findings [Partial] : partial [Does not reveal pre-existing condition(s) that may affect treatment/prognosis] : does not reveal pre-existing condition(s) that may affect treatment/prognosis [N/A] : : Not Applicable [Patient] : patient [No Rx restrictions] : No Rx restrictions. [I provided the services listed above] :  I provided the services listed above. [FreeTextEntry1] : good [FreeTextEntry2] : Pt. is working a new position.

## 2022-04-14 NOTE — ASSESSMENT
[FreeTextEntry1] : Pt. has resolved their injury.\par They can increase activities as tolerated.\par WBAT in supportive footwear is recommended.\par If any issues, RTO.\par

## 2022-04-14 NOTE — RETURN TO WORK/SCHOOL
[Return Date: _____] : as of [unfilled].  This has been discussed in detail with ~Emerson~ and ~he/she~ understands this. [Full Duty] : full duty

## 2022-04-18 ENCOUNTER — FORM ENCOUNTER (OUTPATIENT)
Age: 52
End: 2022-04-18

## 2022-06-17 ENCOUNTER — NON-APPOINTMENT (OUTPATIENT)
Age: 52
End: 2022-06-17

## 2022-10-31 ENCOUNTER — APPOINTMENT (OUTPATIENT)
Dept: INTERNAL MEDICINE | Facility: CLINIC | Age: 52
End: 2022-10-31

## 2022-10-31 ENCOUNTER — NON-APPOINTMENT (OUTPATIENT)
Age: 52
End: 2022-10-31

## 2022-10-31 VITALS
RESPIRATION RATE: 16 BRPM | OXYGEN SATURATION: 96 % | BODY MASS INDEX: 30.21 KG/M2 | WEIGHT: 204 LBS | DIASTOLIC BLOOD PRESSURE: 70 MMHG | HEIGHT: 69 IN | SYSTOLIC BLOOD PRESSURE: 112 MMHG | HEART RATE: 88 BPM

## 2022-10-31 DIAGNOSIS — R68.82 DECREASED LIBIDO: ICD-10-CM

## 2022-10-31 DIAGNOSIS — Z87.898 PERSONAL HISTORY OF OTHER SPECIFIED CONDITIONS: ICD-10-CM

## 2022-10-31 DIAGNOSIS — Z81.1 FAMILY HISTORY OF ALCOHOL ABUSE AND DEPENDENCE: ICD-10-CM

## 2022-10-31 DIAGNOSIS — Z13.31 ENCOUNTER FOR SCREENING FOR DEPRESSION: ICD-10-CM

## 2022-10-31 DIAGNOSIS — S86.812A STRAIN OF OTHER MUSCLE(S) AND TENDON(S) AT LOWER LEG LEVEL, LEFT LEG, INITIAL ENCOUNTER: ICD-10-CM

## 2022-10-31 DIAGNOSIS — Z86.69 PERSONAL HISTORY OF OTHER DISEASES OF THE NERVOUS SYSTEM AND SENSE ORGANS: ICD-10-CM

## 2022-10-31 DIAGNOSIS — Z86.59 PERSONAL HISTORY OF OTHER MENTAL AND BEHAVIORAL DISORDERS: ICD-10-CM

## 2022-10-31 DIAGNOSIS — F41.8 OTHER SPECIFIED ANXIETY DISORDERS: ICD-10-CM

## 2022-10-31 DIAGNOSIS — Z87.19 PERSONAL HISTORY OF OTHER DISEASES OF THE DIGESTIVE SYSTEM: ICD-10-CM

## 2022-10-31 PROCEDURE — G0444 DEPRESSION SCREEN ANNUAL: CPT | Mod: 59

## 2022-10-31 PROCEDURE — 36415 COLL VENOUS BLD VENIPUNCTURE: CPT

## 2022-10-31 PROCEDURE — 93000 ELECTROCARDIOGRAM COMPLETE: CPT | Mod: 59

## 2022-10-31 PROCEDURE — 99396 PREV VISIT EST AGE 40-64: CPT | Mod: 25

## 2022-10-31 RX ORDER — CLOTRIMAZOLE AND BETAMETHASONE DIPROPIONATE 10; .5 MG/G; MG/G
1-0.05 CREAM TOPICAL
Qty: 15 | Refills: 0 | Status: DISCONTINUED | COMMUNITY
Start: 2022-06-18

## 2022-10-31 RX ORDER — CLOTRIMAZOLE AND BETAMETHASONE DIPROPIONATE 10; .5 MG/ML; MG/ML
1-0.05 LOTION TOPICAL TWICE DAILY
Qty: 1 | Refills: 5 | Status: DISCONTINUED | COMMUNITY
Start: 2019-07-17 | End: 2022-10-31

## 2022-10-31 RX ORDER — DESVENLAFAXINE 25 MG/1
25 TABLET, EXTENDED RELEASE ORAL
Qty: 30 | Refills: 2 | Status: DISCONTINUED | COMMUNITY
Start: 2020-12-08 | End: 2022-10-31

## 2022-10-31 RX ORDER — ROSUVASTATIN CALCIUM 5 MG/1
5 TABLET, FILM COATED ORAL
Qty: 30 | Refills: 2 | Status: DISCONTINUED | COMMUNITY
Start: 2021-10-19 | End: 2022-10-31

## 2022-10-31 RX ORDER — VALACYCLOVIR 1 G/1
1 TABLET, FILM COATED ORAL
Qty: 4 | Refills: 2 | Status: DISCONTINUED | COMMUNITY
Start: 2018-03-15 | End: 2022-10-31

## 2022-10-31 RX ORDER — TADALAFIL 20 MG/1
20 TABLET ORAL
Qty: 30 | Refills: 5 | Status: DISCONTINUED | COMMUNITY
Start: 2017-04-28 | End: 2022-10-31

## 2022-10-31 RX ORDER — NAPROXEN 500 MG/1
500 TABLET ORAL
Qty: 1 | Refills: 1 | Status: DISCONTINUED | COMMUNITY
Start: 2017-06-09 | End: 2022-10-31

## 2022-10-31 RX ORDER — GABAPENTIN 600 MG/1
600 TABLET, COATED ORAL
Qty: 30 | Refills: 0 | Status: DISCONTINUED | COMMUNITY
Start: 2022-09-19

## 2022-10-31 NOTE — HISTORY OF PRESENT ILLNESS
[FreeTextEntry1] : Annual wellness visit [de-identified] : 52-year-old male presented to this office for the first time to establish care and for his annual wellness visit.\par \par Patient general medical health has been good but he was obese with hyperlipidemia and most recent hemoglobin A1c elevated at 5.7.\par The patient has made significant dramatic lifestyle changes over the past 2 to 3 months with a strict diet including intermittent fasting as well as regular exercise and patient being well aware of his dietary intake.\par Over this time he is lost 40 pounds.\par With this he is very happy and feels much better overall physically and psychologically.\par \par 1 issue that he had was anxiety and depression previously on medication but now not other than as needed clonazepam.  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

## 2022-10-31 NOTE — PHYSICAL EXAM
[Normal] : no acute distress, well nourished, well developed and well-appearing [Normal Appearance] : normal in appearance [Normal Supraclavicular Nodes] : no supraclavicular lymphadenopathy [Normal Posterior Cervical Nodes] : no posterior cervical lymphadenopathy [Normal Anterior Cervical Nodes] : no anterior cervical lymphadenopathy [Normal Inguinal Nodes] : no inguinal lymphadenopathy [Normal Femoral Nodes] : no femoral lymphadenopathy [No Acute Distress] : no acute distress [Well Nourished] : well nourished [Well Developed] : well developed [Well-Appearing] : well-appearing [Normal Sclera/Conjunctiva] : normal sclera/conjunctiva [PERRL] : pupils equal round and reactive to light [EOMI] : extraocular movements intact [Normal Outer Ear/Nose] : the outer ears and nose were normal in appearance [Normal Oropharynx] : the oropharynx was normal [Normal TMs] : both tympanic membranes were normal [No JVD] : no jugular venous distention [No Lymphadenopathy] : no lymphadenopathy [Supple] : supple [Thyroid Normal, No Nodules] : the thyroid was normal and there were no nodules present [No Respiratory Distress] : no respiratory distress  [No Accessory Muscle Use] : no accessory muscle use [Clear to Auscultation] : lungs were clear to auscultation bilaterally [Normal Rate] : normal rate  [Regular Rhythm] : with a regular rhythm [Normal S1, S2] : normal S1 and S2 [No Murmur] : no murmur heard [No Carotid Bruits] : no carotid bruits [No Abdominal Bruit] : a ~M bruit was not heard ~T in the abdomen [No Varicosities] : no varicosities [Pedal Pulses Present] : the pedal pulses are present [No Edema] : there was no peripheral edema [No Palpable Aorta] : no palpable aorta [No Extremity Clubbing/Cyanosis] : no extremity clubbing/cyanosis [Soft] : abdomen soft [Non Tender] : non-tender [Non-distended] : non-distended [No Masses] : no abdominal mass palpated [No HSM] : no HSM [Normal Bowel Sounds] : normal bowel sounds [No Mass] : no mass [Prostate Enlargement] : the prostate was not enlarged [Prostate Tenderness] : the prostate was not tender [No Prostate Nodules] : no prostate nodules [No CVA Tenderness] : no CVA  tenderness [No Spinal Tenderness] : no spinal tenderness [No Joint Swelling] : no joint swelling [Grossly Normal Strength/Tone] : grossly normal strength/tone [No Rash] : no rash [Coordination Grossly Intact] : coordination grossly intact [No Focal Deficits] : no focal deficits [Normal Gait] : normal gait [Deep Tendon Reflexes (DTR)] : deep tendon reflexes were 2+ and symmetric [Normal Affect] : the affect was normal [Normal Mood] : the mood was normal [Normal Insight/Judgement] : insight and judgment were intact

## 2022-10-31 NOTE — ASSESSMENT
[FreeTextEntry1] : 52-year-old male with history of obesity and prediabetes has made some excellent lifestyle changes over the past 2 to 3 months with about 40 pounds of weight loss.\par Patient very motivated and informed about changes he has made and is very motivated to continue.\par He feels much better physically and psychologically\par \par History of depression and anxiety previously on medication which has greatly improved therefore continue lifestyle changes.\par \par Patient has never had a colonoscopy therefore referral given.\par \par Increased cardiovascular risks with hyperlipidemia therefore refer for coronary calcium CAT scan.\par \par Venipuncture done today in the office\par \par Received COVID vaccines\par Currently declining influenza vaccine\par \par Follow-up yearly and as needed

## 2022-10-31 NOTE — HEALTH RISK ASSESSMENT
[No] : In the past 12 months have you used drugs other than those required for medical reasons? No [No falls in past year] : Patient reported no falls in the past year [0] : 2) Feeling down, depressed, or hopeless: Not at all (0) [HIV Test offered] : HIV Test offered [None] : None [With Family] : lives with family [Employed] : employed [] :  [Fully functional (bathing, dressing, toileting, transferring, walking, feeding)] : Fully functional (bathing, dressing, toileting, transferring, walking, feeding) [Fully functional (using the telephone, shopping, preparing meals, housekeeping, doing laundry, using] : Fully functional and needs no help or supervision to perform IADLs (using the telephone, shopping, preparing meals, housekeeping, doing laundry, using transportation, managing medications and managing finances) [Reports normal functional visual acuity (ie: able to read med bottle)] : Reports normal functional visual acuity [Smoke Detector] : smoke detector [Carbon Monoxide Detector] : carbon monoxide detector [Safety elements used in home] : safety elements used in home [Seat Belt] :  uses seat belt [Sunscreen] : uses sunscreen [Very Good] : ~his/her~ current health as very good [Excellent] : ~his/her~  mood as  excellent [Never] : Never [Yes] : Yes [Monthly or less (1 pt)] : Monthly or less (1 point) [1 or 2 (0 pts)] : 1 or 2 (0 points) [Never (0 pts)] : Never (0 points) [PHQ-2 Negative - No further assessment needed] : PHQ-2 Negative - No further assessment needed [With Significant Other] : lives with significant other [College] : College [Feels Safe at Home] : Feels safe at home [Reviewed no changes] : Reviewed, no changes [Designated Healthcare Proxy] : Designated healthcare proxy [Name: ___] : Health Care Proxy's Name: [unfilled]  [Relationship: ___] : Relationship: [unfilled] [Audit-CScore] : 1 [de-identified] : walking daily [de-identified] : much improved [[id number]] : 0 [Change in mental status noted] : No change in mental status noted [Language] : denies difficulty with language [Behavior] : denies difficulty with behavior [Learning/Retaining New Information] : denies difficulty learning/retaining new information [Handling Complex Tasks] : denies difficulty handling complex tasks [Reasoning] : denies difficulty with reasoning [Spatial Ability and Orientation] : denies difficulty with spatial ability and orientation [# Of Children ___] : has [unfilled] children [Reports changes in hearing] : Reports no changes in hearing [Reports changes in vision] : Reports no changes in vision [Reports changes in dental health] : Reports no changes in dental health [Guns at Home] : no guns at home [Travel to Developing Areas] : does not  travel to developing areas [TB Exposure] : is not being exposed to tuberculosis [Caregiver Concerns] : does not have caregiver concerns [FreeTextEntry2] :  [AdvancecareDate] : 10/22

## 2022-11-01 ENCOUNTER — TRANSCRIPTION ENCOUNTER (OUTPATIENT)
Age: 52
End: 2022-11-01

## 2022-11-01 LAB
25(OH)D3 SERPL-MCNC: 41 NG/ML
ALBUMIN SERPL ELPH-MCNC: 4.9 G/DL
ALP BLD-CCNC: 71 U/L
ALT SERPL-CCNC: 23 U/L
ANION GAP SERPL CALC-SCNC: 18 MMOL/L
APPEARANCE: CLEAR
AST SERPL-CCNC: 28 U/L
BACTERIA: NEGATIVE
BASOPHILS # BLD AUTO: 0.06 K/UL
BASOPHILS NFR BLD AUTO: 1.3 %
BILIRUB SERPL-MCNC: 0.3 MG/DL
BILIRUBIN URINE: NEGATIVE
BLOOD URINE: NEGATIVE
BUN SERPL-MCNC: 12 MG/DL
CALCIUM SERPL-MCNC: 9.6 MG/DL
CHLORIDE SERPL-SCNC: 99 MMOL/L
CHOLEST SERPL-MCNC: 190 MG/DL
CO2 SERPL-SCNC: 22 MMOL/L
COLOR: YELLOW
CREAT SERPL-MCNC: 1.17 MG/DL
EGFR: 75 ML/MIN/1.73M2
EOSINOPHIL # BLD AUTO: 0.06 K/UL
EOSINOPHIL NFR BLD AUTO: 1.3 %
ESTIMATED AVERAGE GLUCOSE: 111 MG/DL
GLUCOSE QUALITATIVE U: NEGATIVE
GLUCOSE SERPL-MCNC: 82 MG/DL
HBA1C MFR BLD HPLC: 5.5 %
HCT VFR BLD CALC: 42.9 %
HDLC SERPL-MCNC: 49 MG/DL
HGB BLD-MCNC: 13.4 G/DL
HYALINE CASTS: 1 /LPF
IMM GRANULOCYTES NFR BLD AUTO: 0.2 %
KETONES URINE: ABNORMAL
LDLC SERPL CALC-MCNC: 124 MG/DL
LEUKOCYTE ESTERASE URINE: NEGATIVE
LYMPHOCYTES # BLD AUTO: 1.38 K/UL
LYMPHOCYTES NFR BLD AUTO: 29.9 %
MAN DIFF?: NORMAL
MCHC RBC-ENTMCNC: 26.7 PG
MCHC RBC-ENTMCNC: 31.2 GM/DL
MCV RBC AUTO: 85.5 FL
MICROSCOPIC-UA: NORMAL
MONOCYTES # BLD AUTO: 0.49 K/UL
MONOCYTES NFR BLD AUTO: 10.6 %
NEUTROPHILS # BLD AUTO: 2.62 K/UL
NEUTROPHILS NFR BLD AUTO: 56.7 %
NITRITE URINE: NEGATIVE
NONHDLC SERPL-MCNC: 141 MG/DL
PH URINE: 6
PLATELET # BLD AUTO: 266 K/UL
POTASSIUM SERPL-SCNC: 4.8 MMOL/L
PROT SERPL-MCNC: 7.2 G/DL
PROTEIN URINE: ABNORMAL
PSA SERPL-MCNC: 0.38 NG/ML
RBC # BLD: 5.02 M/UL
RBC # FLD: 15.8 %
RED BLOOD CELLS URINE: 1 /HPF
SODIUM SERPL-SCNC: 140 MMOL/L
SPECIFIC GRAVITY URINE: 1.03
SQUAMOUS EPITHELIAL CELLS: 0 /HPF
TRIGL SERPL-MCNC: 81 MG/DL
UROBILINOGEN URINE: NORMAL
WBC # FLD AUTO: 4.62 K/UL
WHITE BLOOD CELLS URINE: 1 /HPF

## 2022-11-15 ENCOUNTER — TRANSCRIPTION ENCOUNTER (OUTPATIENT)
Age: 52
End: 2022-11-15

## 2022-11-17 ENCOUNTER — NON-APPOINTMENT (OUTPATIENT)
Age: 52
End: 2022-11-17

## 2022-11-29 ENCOUNTER — APPOINTMENT (OUTPATIENT)
Dept: CT IMAGING | Facility: CLINIC | Age: 52
End: 2022-11-29

## 2022-11-29 ENCOUNTER — OUTPATIENT (OUTPATIENT)
Dept: OUTPATIENT SERVICES | Facility: HOSPITAL | Age: 52
LOS: 1 days | End: 2022-11-29
Payer: COMMERCIAL

## 2022-11-29 DIAGNOSIS — E78.5 HYPERLIPIDEMIA, UNSPECIFIED: ICD-10-CM

## 2022-11-29 PROCEDURE — 75571 CT HRT W/O DYE W/CA TEST: CPT

## 2022-11-29 PROCEDURE — 75571 CT HRT W/O DYE W/CA TEST: CPT | Mod: 26

## 2022-11-30 ENCOUNTER — NON-APPOINTMENT (OUTPATIENT)
Age: 52
End: 2022-11-30

## 2022-12-20 DIAGNOSIS — R80.9 PROTEINURIA, UNSPECIFIED: ICD-10-CM

## 2022-12-21 ENCOUNTER — NON-APPOINTMENT (OUTPATIENT)
Age: 52
End: 2022-12-21

## 2022-12-21 ENCOUNTER — TRANSCRIPTION ENCOUNTER (OUTPATIENT)
Age: 52
End: 2022-12-21

## 2022-12-21 LAB
APPEARANCE: CLEAR
BACTERIA: NEGATIVE
BILIRUBIN URINE: NEGATIVE
BLOOD URINE: NEGATIVE
COLOR: YELLOW
GLUCOSE QUALITATIVE U: NEGATIVE
HYALINE CASTS: 0 /LPF
KETONES URINE: NEGATIVE
LEUKOCYTE ESTERASE URINE: NEGATIVE
MICROSCOPIC-UA: NORMAL
NITRITE URINE: NEGATIVE
PH URINE: 6
PROTEIN URINE: NORMAL
RED BLOOD CELLS URINE: 2 /HPF
SPECIFIC GRAVITY URINE: 1.02
SQUAMOUS EPITHELIAL CELLS: 0 /HPF
UROBILINOGEN URINE: NORMAL
WHITE BLOOD CELLS URINE: 0 /HPF

## 2023-03-08 ENCOUNTER — APPOINTMENT (OUTPATIENT)
Dept: OPHTHALMOLOGY | Facility: CLINIC | Age: 53
End: 2023-03-08
Payer: COMMERCIAL

## 2023-03-08 ENCOUNTER — NON-APPOINTMENT (OUTPATIENT)
Age: 53
End: 2023-03-08

## 2023-03-08 PROCEDURE — 92004 COMPRE OPH EXAM NEW PT 1/>: CPT

## 2023-05-17 ENCOUNTER — APPOINTMENT (OUTPATIENT)
Dept: UROLOGY | Facility: CLINIC | Age: 53
End: 2023-05-17

## 2023-06-24 ENCOUNTER — NON-APPOINTMENT (OUTPATIENT)
Age: 53
End: 2023-06-24

## 2023-06-25 ENCOUNTER — NON-APPOINTMENT (OUTPATIENT)
Age: 53
End: 2023-06-25

## 2023-06-26 ENCOUNTER — NON-APPOINTMENT (OUTPATIENT)
Age: 53
End: 2023-06-26

## 2023-06-29 ENCOUNTER — TRANSCRIPTION ENCOUNTER (OUTPATIENT)
Age: 53
End: 2023-06-29

## 2023-06-29 ENCOUNTER — NON-APPOINTMENT (OUTPATIENT)
Age: 53
End: 2023-06-29

## 2023-07-02 ENCOUNTER — NON-APPOINTMENT (OUTPATIENT)
Age: 53
End: 2023-07-02

## 2023-07-04 ENCOUNTER — NON-APPOINTMENT (OUTPATIENT)
Age: 53
End: 2023-07-04

## 2023-11-28 ENCOUNTER — APPOINTMENT (OUTPATIENT)
Dept: INTERNAL MEDICINE | Facility: CLINIC | Age: 53
End: 2023-11-28
Payer: COMMERCIAL

## 2023-11-28 ENCOUNTER — NON-APPOINTMENT (OUTPATIENT)
Age: 53
End: 2023-11-28

## 2023-11-28 VITALS
OXYGEN SATURATION: 98 % | HEIGHT: 69 IN | RESPIRATION RATE: 13 BRPM | SYSTOLIC BLOOD PRESSURE: 130 MMHG | HEART RATE: 82 BPM | WEIGHT: 185 LBS | BODY MASS INDEX: 27.4 KG/M2 | DIASTOLIC BLOOD PRESSURE: 80 MMHG

## 2023-11-28 DIAGNOSIS — Z00.00 ENCOUNTER FOR GENERAL ADULT MEDICAL EXAMINATION W/OUT ABNORMAL FINDINGS: ICD-10-CM

## 2023-11-28 DIAGNOSIS — L30.9 DERMATITIS, UNSPECIFIED: ICD-10-CM

## 2023-11-28 DIAGNOSIS — Z13.6 ENCOUNTER FOR SCREENING FOR CARDIOVASCULAR DISORDERS: ICD-10-CM

## 2023-11-28 DIAGNOSIS — R73.09 OTHER ABNORMAL GLUCOSE: ICD-10-CM

## 2023-11-28 DIAGNOSIS — Z12.5 ENCOUNTER FOR SCREENING FOR MALIGNANT NEOPLASM OF PROSTATE: ICD-10-CM

## 2023-11-28 DIAGNOSIS — Z12.11 ENCOUNTER FOR SCREENING FOR MALIGNANT NEOPLASM OF COLON: ICD-10-CM

## 2023-11-28 DIAGNOSIS — E78.5 HYPERLIPIDEMIA, UNSPECIFIED: ICD-10-CM

## 2023-11-28 DIAGNOSIS — E55.9 VITAMIN D DEFICIENCY, UNSPECIFIED: ICD-10-CM

## 2023-11-28 PROCEDURE — 99396 PREV VISIT EST AGE 40-64: CPT | Mod: 25

## 2023-11-28 PROCEDURE — 90471 IMMUNIZATION ADMIN: CPT

## 2023-11-28 PROCEDURE — 93000 ELECTROCARDIOGRAM COMPLETE: CPT

## 2023-11-28 PROCEDURE — 36415 COLL VENOUS BLD VENIPUNCTURE: CPT

## 2023-11-28 PROCEDURE — 90750 HZV VACC RECOMBINANT IM: CPT

## 2023-11-29 ENCOUNTER — TRANSCRIPTION ENCOUNTER (OUTPATIENT)
Age: 53
End: 2023-11-29

## 2023-11-29 PROBLEM — L30.9 ECZEMA OF BOTH HANDS: Status: ACTIVE | Noted: 2023-11-29

## 2023-11-29 LAB
25(OH)D3 SERPL-MCNC: 40.9 NG/ML
ALBUMIN SERPL ELPH-MCNC: 4.7 G/DL
ALP BLD-CCNC: 76 U/L
ALT SERPL-CCNC: 18 U/L
ANION GAP SERPL CALC-SCNC: 12 MMOL/L
APPEARANCE: CLEAR
AST SERPL-CCNC: 16 U/L
BACTERIA: NEGATIVE /HPF
BASOPHILS # BLD AUTO: 0.07 K/UL
BASOPHILS NFR BLD AUTO: 1.2 %
BILIRUB SERPL-MCNC: 0.2 MG/DL
BILIRUBIN URINE: NEGATIVE
BLOOD URINE: NEGATIVE
BUN SERPL-MCNC: 17 MG/DL
CALCIUM SERPL-MCNC: 9.4 MG/DL
CAST: 0 /LPF
CHLORIDE SERPL-SCNC: 101 MMOL/L
CHOLEST SERPL-MCNC: 221 MG/DL
CO2 SERPL-SCNC: 26 MMOL/L
COLOR: YELLOW
CREAT SERPL-MCNC: 1.06 MG/DL
EGFR: 84 ML/MIN/1.73M2
EOSINOPHIL # BLD AUTO: 0.24 K/UL
EOSINOPHIL NFR BLD AUTO: 4.1 %
EPITHELIAL CELLS: 0 /HPF
ESTIMATED AVERAGE GLUCOSE: 114 MG/DL
GLUCOSE QUALITATIVE U: NEGATIVE MG/DL
GLUCOSE SERPL-MCNC: 96 MG/DL
HBA1C MFR BLD HPLC: 5.6 %
HCT VFR BLD CALC: 43.9 %
HDLC SERPL-MCNC: 75 MG/DL
HGB BLD-MCNC: 14 G/DL
IMM GRANULOCYTES NFR BLD AUTO: 0.5 %
KETONES URINE: NEGATIVE MG/DL
LDLC SERPL CALC-MCNC: 133 MG/DL
LEUKOCYTE ESTERASE URINE: ABNORMAL
LYMPHOCYTES # BLD AUTO: 2.23 K/UL
LYMPHOCYTES NFR BLD AUTO: 37.9 %
MAN DIFF?: NORMAL
MCHC RBC-ENTMCNC: 27.2 PG
MCHC RBC-ENTMCNC: 31.9 GM/DL
MCV RBC AUTO: 85.2 FL
MICROSCOPIC-UA: NORMAL
MONOCYTES # BLD AUTO: 0.51 K/UL
MONOCYTES NFR BLD AUTO: 8.7 %
NEUTROPHILS # BLD AUTO: 2.8 K/UL
NEUTROPHILS NFR BLD AUTO: 47.6 %
NITRITE URINE: NEGATIVE
NONHDLC SERPL-MCNC: 146 MG/DL
PH URINE: 6
PLATELET # BLD AUTO: 278 K/UL
POTASSIUM SERPL-SCNC: 4.7 MMOL/L
PROT SERPL-MCNC: 6.9 G/DL
PROTEIN URINE: NEGATIVE MG/DL
PSA SERPL-MCNC: 0.8 NG/ML
RBC # BLD: 5.15 M/UL
RBC # FLD: 15.5 %
RED BLOOD CELLS URINE: 0 /HPF
SODIUM SERPL-SCNC: 140 MMOL/L
SPECIFIC GRAVITY URINE: 1.02
TRIGL SERPL-MCNC: 76 MG/DL
UROBILINOGEN URINE: 0.2 MG/DL
WBC # FLD AUTO: 5.88 K/UL
WHITE BLOOD CELLS URINE: 1 /HPF

## 2023-12-12 ENCOUNTER — NON-APPOINTMENT (OUTPATIENT)
Age: 53
End: 2023-12-12

## 2023-12-16 ENCOUNTER — NON-APPOINTMENT (OUTPATIENT)
Age: 53
End: 2023-12-16

## 2024-02-12 ENCOUNTER — APPOINTMENT (OUTPATIENT)
Dept: INTERNAL MEDICINE | Facility: CLINIC | Age: 54
End: 2024-02-12

## 2024-03-12 ENCOUNTER — NON-APPOINTMENT (OUTPATIENT)
Age: 54
End: 2024-03-12

## 2024-03-14 ENCOUNTER — APPOINTMENT (OUTPATIENT)
Dept: INTERNAL MEDICINE | Facility: CLINIC | Age: 54
End: 2024-03-14
Payer: COMMERCIAL

## 2024-03-14 VITALS
BODY MASS INDEX: 27.4 KG/M2 | SYSTOLIC BLOOD PRESSURE: 120 MMHG | RESPIRATION RATE: 14 BRPM | WEIGHT: 185 LBS | DIASTOLIC BLOOD PRESSURE: 82 MMHG | OXYGEN SATURATION: 96 % | HEIGHT: 69 IN | HEART RATE: 56 BPM

## 2024-03-14 DIAGNOSIS — Z23 ENCOUNTER FOR IMMUNIZATION: ICD-10-CM

## 2024-03-14 DIAGNOSIS — L56.8 OTHER SPECIFIED ACUTE SKIN CHANGES DUE TO ULTRAVIOLET RADIATION: ICD-10-CM

## 2024-03-14 PROCEDURE — 90750 HZV VACC RECOMBINANT IM: CPT

## 2024-03-14 PROCEDURE — 99213 OFFICE O/P EST LOW 20 MIN: CPT | Mod: 25

## 2024-03-14 PROCEDURE — G2211 COMPLEX E/M VISIT ADD ON: CPT | Mod: NC

## 2024-03-14 PROCEDURE — 90471 IMMUNIZATION ADMIN: CPT

## 2024-03-14 RX ORDER — FLUTICASONE PROPIONATE 0.5 MG/G
0.05 CREAM TOPICAL TWICE DAILY
Qty: 1 | Refills: 1 | Status: ACTIVE | COMMUNITY
Start: 2023-11-29 | End: 1900-01-01

## 2024-03-14 NOTE — ASSESSMENT
[FreeTextEntry1] : Patient with increased photosensitivity attempting to get tenting on his front windshield but needs DMV form completed therefore patient referred to ophthalmology.  Shingrix No. 2 given left deltoid  Fluticasone cream renewed

## 2024-03-14 NOTE — HISTORY OF PRESENT ILLNESS
[FreeTextEntry8] : The patient presents for an acute visit with multiple questions and issues. First he has developed increased light sensitivity and photosensitivity therefore is attempting to get front windshield tenting on his car when does but cannot get it unless a form for the DMV is completed.  He has not seen ophthalmology for this as yet.  Requests to review his labs from his physical November 2023.  Needs refill of his fluticasone cream  Due for Shingrix No. 2  Also he was at urgent care yesterday for mild URI symptoms testing negative for COVID and influenza and feels well today.

## 2024-03-14 NOTE — PHYSICAL EXAM
[No Acute Distress] : no acute distress [Well-Appearing] : well-appearing [Normal Oropharynx] : the oropharynx was normal [No Respiratory Distress] : no respiratory distress  [Clear to Auscultation] : lungs were clear to auscultation bilaterally [No Accessory Muscle Use] : no accessory muscle use [Normal Rate] : normal rate  [Regular Rhythm] : with a regular rhythm Refused

## 2024-03-25 ENCOUNTER — APPOINTMENT (OUTPATIENT)
Dept: OPHTHALMOLOGY | Facility: CLINIC | Age: 54
End: 2024-03-25
Payer: COMMERCIAL

## 2024-03-25 ENCOUNTER — NON-APPOINTMENT (OUTPATIENT)
Age: 54
End: 2024-03-25

## 2024-03-25 PROCEDURE — 92014 COMPRE OPH EXAM EST PT 1/>: CPT

## 2024-06-20 ENCOUNTER — APPOINTMENT (OUTPATIENT)
Dept: ORTHOPEDIC SURGERY | Facility: CLINIC | Age: 54
End: 2024-06-20

## 2024-06-24 ENCOUNTER — NON-APPOINTMENT (OUTPATIENT)
Age: 54
End: 2024-06-24

## 2024-08-21 ENCOUNTER — APPOINTMENT (OUTPATIENT)
Dept: DERMATOLOGY | Facility: CLINIC | Age: 54
End: 2024-08-21

## 2024-09-27 ENCOUNTER — APPOINTMENT (OUTPATIENT)
Dept: DERMATOLOGY | Facility: CLINIC | Age: 54
End: 2024-09-27
Payer: COMMERCIAL

## 2024-09-27 DIAGNOSIS — L85.3 XEROSIS CUTIS: ICD-10-CM

## 2024-09-27 DIAGNOSIS — L23.9 ALLERGIC CONTACT DERMATITIS, UNSPECIFIED CAUSE: ICD-10-CM

## 2024-09-27 PROCEDURE — 99203 OFFICE O/P NEW LOW 30 MIN: CPT

## 2024-09-27 RX ORDER — CLOBETASOL PROPIONATE 0.5 MG/G
0.05 OINTMENT TOPICAL
Qty: 1 | Refills: 0 | Status: ACTIVE | COMMUNITY
Start: 2024-09-27 | End: 1900-01-01

## 2024-09-27 NOTE — ASSESSMENT
[FreeTextEntry1] : #ACD -start clobetasol ointment BID for up to 2 weeks then stop. SED --discussed using cotton, nitrile, or vinyl gloves, saran wraps for a couple of days  --discussed patch testing if not improved   # Xerosis Cutis Gentle skin care reviewed. Short baths/showers every other day, moisturize within three minutes of getting out of the shower, lukewarm water, liberal use of emollients at least 2-3X/day Cetaphil or Cerave cream BID to TID. Dove non scented soap in showers. -Provided hand out on dry skin care and recommended gentle moisturizing creams -Recommended limiting baths and showers to 5min and using warm not hot water, patting dry with towel without rubbing, and moisturizing immediately afterwards

## 2024-09-27 NOTE — HISTORY OF PRESENT ILLNESS
[FreeTextEntry1] : hand rash [de-identified] : 54 year old male with 6 month history of hand rash. Works at [employer].

## 2024-11-25 ENCOUNTER — APPOINTMENT (OUTPATIENT)
Dept: DERMATOLOGY | Facility: CLINIC | Age: 54
End: 2024-11-25
Payer: COMMERCIAL

## 2024-11-25 VITALS — HEIGHT: 69 IN | WEIGHT: 180 LBS | BODY MASS INDEX: 26.66 KG/M2

## 2024-11-25 DIAGNOSIS — L21.9 SEBORRHEIC DERMATITIS, UNSPECIFIED: ICD-10-CM

## 2024-11-25 DIAGNOSIS — L23.9 ALLERGIC CONTACT DERMATITIS, UNSPECIFIED CAUSE: ICD-10-CM

## 2024-11-25 DIAGNOSIS — L85.3 XEROSIS CUTIS: ICD-10-CM

## 2024-11-25 PROCEDURE — 99214 OFFICE O/P EST MOD 30 MIN: CPT

## 2024-11-25 RX ORDER — TACROLIMUS 1 MG/G
0.1 OINTMENT TOPICAL
Qty: 1 | Refills: 2 | Status: ACTIVE | COMMUNITY
Start: 2024-11-25 | End: 1900-01-01

## 2024-11-25 RX ORDER — KETOCONAZOLE 20 MG/ML
2 SUSPENSION TOPICAL
Qty: 1 | Refills: 6 | Status: ACTIVE | COMMUNITY
Start: 2024-11-25 | End: 1900-01-01

## 2024-12-04 ENCOUNTER — APPOINTMENT (OUTPATIENT)
Dept: DERMATOLOGY | Facility: CLINIC | Age: 54
End: 2024-12-04

## 2024-12-06 ENCOUNTER — APPOINTMENT (OUTPATIENT)
Dept: DERMATOLOGY | Facility: CLINIC | Age: 54
End: 2024-12-06

## 2024-12-09 ENCOUNTER — APPOINTMENT (OUTPATIENT)
Dept: DERMATOLOGY | Facility: CLINIC | Age: 54
End: 2024-12-09

## 2024-12-16 ENCOUNTER — APPOINTMENT (OUTPATIENT)
Dept: INTERNAL MEDICINE | Facility: CLINIC | Age: 54
End: 2024-12-16
Payer: COMMERCIAL

## 2024-12-16 ENCOUNTER — NON-APPOINTMENT (OUTPATIENT)
Age: 54
End: 2024-12-16

## 2024-12-16 VITALS
WEIGHT: 185 LBS | HEIGHT: 69 IN | SYSTOLIC BLOOD PRESSURE: 130 MMHG | RESPIRATION RATE: 13 BRPM | HEART RATE: 87 BPM | OXYGEN SATURATION: 97 % | DIASTOLIC BLOOD PRESSURE: 86 MMHG | BODY MASS INDEX: 27.4 KG/M2

## 2024-12-16 DIAGNOSIS — E55.9 VITAMIN D DEFICIENCY, UNSPECIFIED: ICD-10-CM

## 2024-12-16 DIAGNOSIS — R22.9 LOCALIZED SWELLING, MASS AND LUMP, UNSPECIFIED: ICD-10-CM

## 2024-12-16 DIAGNOSIS — Z12.5 ENCOUNTER FOR SCREENING FOR MALIGNANT NEOPLASM OF PROSTATE: ICD-10-CM

## 2024-12-16 DIAGNOSIS — Z13.6 ENCOUNTER FOR SCREENING FOR CARDIOVASCULAR DISORDERS: ICD-10-CM

## 2024-12-16 DIAGNOSIS — Z00.00 ENCOUNTER FOR GENERAL ADULT MEDICAL EXAMINATION W/OUT ABNORMAL FINDINGS: ICD-10-CM

## 2024-12-16 DIAGNOSIS — R73.09 OTHER ABNORMAL GLUCOSE: ICD-10-CM

## 2024-12-16 DIAGNOSIS — E78.5 HYPERLIPIDEMIA, UNSPECIFIED: ICD-10-CM

## 2024-12-16 PROCEDURE — 99396 PREV VISIT EST AGE 40-64: CPT

## 2024-12-16 PROCEDURE — 93000 ELECTROCARDIOGRAM COMPLETE: CPT

## 2024-12-16 PROCEDURE — 36415 COLL VENOUS BLD VENIPUNCTURE: CPT

## 2024-12-17 ENCOUNTER — TRANSCRIPTION ENCOUNTER (OUTPATIENT)
Age: 54
End: 2024-12-17

## 2024-12-17 LAB
25(OH)D3 SERPL-MCNC: 36.4 NG/ML
ALBUMIN SERPL ELPH-MCNC: 4.8 G/DL
ALP BLD-CCNC: 65 U/L
ALT SERPL-CCNC: 13 U/L
ANION GAP SERPL CALC-SCNC: 13 MMOL/L
APPEARANCE: CLEAR
AST SERPL-CCNC: 18 U/L
BACTERIA: NEGATIVE /HPF
BASOPHILS # BLD AUTO: 0.06 K/UL
BASOPHILS NFR BLD AUTO: 1.2 %
BILIRUB SERPL-MCNC: 0.4 MG/DL
BILIRUBIN URINE: NEGATIVE
BLOOD URINE: NEGATIVE
BUN SERPL-MCNC: 12 MG/DL
CALCIUM SERPL-MCNC: 9.5 MG/DL
CAST: 0 /LPF
CHLORIDE SERPL-SCNC: 104 MMOL/L
CHOLEST SERPL-MCNC: 226 MG/DL
CO2 SERPL-SCNC: 25 MMOL/L
COLOR: YELLOW
CREAT SERPL-MCNC: 1.09 MG/DL
EGFR: 81 ML/MIN/1.73M2
EOSINOPHIL # BLD AUTO: 0.21 K/UL
EOSINOPHIL NFR BLD AUTO: 4.1 %
EPITHELIAL CELLS: 0 /HPF
ESTIMATED AVERAGE GLUCOSE: 114 MG/DL
GLUCOSE QUALITATIVE U: NEGATIVE MG/DL
GLUCOSE SERPL-MCNC: 96 MG/DL
HBA1C MFR BLD HPLC: 5.6 %
HCT VFR BLD CALC: 43.9 %
HDLC SERPL-MCNC: 54 MG/DL
HGB BLD-MCNC: 13.2 G/DL
IMM GRANULOCYTES NFR BLD AUTO: 0.2 %
KETONES URINE: NEGATIVE MG/DL
LDLC SERPL CALC-MCNC: 158 MG/DL
LEUKOCYTE ESTERASE URINE: NEGATIVE
LYMPHOCYTES # BLD AUTO: 1.65 K/UL
LYMPHOCYTES NFR BLD AUTO: 32.5 %
MAN DIFF?: NORMAL
MCHC RBC-ENTMCNC: 26.6 PG
MCHC RBC-ENTMCNC: 30.1 G/DL
MCV RBC AUTO: 88.5 FL
MICROSCOPIC-UA: NORMAL
MONOCYTES # BLD AUTO: 0.55 K/UL
MONOCYTES NFR BLD AUTO: 10.8 %
NEUTROPHILS # BLD AUTO: 2.6 K/UL
NEUTROPHILS NFR BLD AUTO: 51.2 %
NITRITE URINE: NEGATIVE
NONHDLC SERPL-MCNC: 171 MG/DL
PH URINE: 6.5
PLATELET # BLD AUTO: 310 K/UL
POTASSIUM SERPL-SCNC: 5.1 MMOL/L
PROT SERPL-MCNC: 7.1 G/DL
PROTEIN URINE: NEGATIVE MG/DL
PSA SERPL-MCNC: 1 NG/ML
RBC # BLD: 4.96 M/UL
RBC # FLD: 14.4 %
RED BLOOD CELLS URINE: 1 /HPF
SODIUM SERPL-SCNC: 142 MMOL/L
SPECIFIC GRAVITY URINE: 1.01
TRIGL SERPL-MCNC: 75 MG/DL
UROBILINOGEN URINE: 0.2 MG/DL
WBC # FLD AUTO: 5.08 K/UL
WHITE BLOOD CELLS URINE: 0 /HPF

## 2024-12-22 PROBLEM — Z12.11 SCREENING FOR COLON CANCER: Status: ACTIVE | Noted: 2024-12-22

## 2024-12-22 PROBLEM — Z12.11 SCREENING FOR COLON CANCER: Status: RESOLVED | Noted: 2023-11-28 | Resolved: 2024-12-22

## 2025-01-06 ENCOUNTER — APPOINTMENT (OUTPATIENT)
Dept: DERMATOLOGY | Facility: CLINIC | Age: 55
End: 2025-01-06

## 2025-01-09 RX ORDER — SODIUM SULFATE, POTASSIUM SULFATE AND MAGNESIUM SULFATE 1.6; 3.13; 17.5 G/177ML; G/177ML; G/177ML
17.5-3.13-1.6 SOLUTION ORAL
Qty: 1 | Refills: 0 | Status: ACTIVE | COMMUNITY
Start: 2025-01-09 | End: 1900-01-01

## 2025-01-22 ENCOUNTER — APPOINTMENT (OUTPATIENT)
Dept: DERMATOLOGY | Facility: CLINIC | Age: 55
End: 2025-01-22
Payer: COMMERCIAL

## 2025-01-22 PROCEDURE — 99214 OFFICE O/P EST MOD 30 MIN: CPT | Mod: 25

## 2025-01-22 PROCEDURE — 95044 PATCH/APPLICATION TESTS: CPT

## 2025-01-24 ENCOUNTER — APPOINTMENT (OUTPATIENT)
Dept: DERMATOLOGY | Facility: CLINIC | Age: 55
End: 2025-01-24
Payer: COMMERCIAL

## 2025-01-24 PROCEDURE — 99212 OFFICE O/P EST SF 10 MIN: CPT

## 2025-01-27 ENCOUNTER — APPOINTMENT (OUTPATIENT)
Dept: DERMATOLOGY | Facility: CLINIC | Age: 55
End: 2025-01-27
Payer: COMMERCIAL

## 2025-01-27 DIAGNOSIS — L30.9 DERMATITIS, UNSPECIFIED: ICD-10-CM

## 2025-01-27 DIAGNOSIS — L23.9 ALLERGIC CONTACT DERMATITIS, UNSPECIFIED CAUSE: ICD-10-CM

## 2025-01-27 PROCEDURE — G2211 COMPLEX E/M VISIT ADD ON: CPT

## 2025-01-27 PROCEDURE — 99214 OFFICE O/P EST MOD 30 MIN: CPT

## 2025-02-24 ENCOUNTER — APPOINTMENT (OUTPATIENT)
Dept: DERMATOLOGY | Facility: CLINIC | Age: 55
End: 2025-02-24

## 2025-04-25 ENCOUNTER — TRANSCRIPTION ENCOUNTER (OUTPATIENT)
Age: 55
End: 2025-04-25

## 2025-04-30 ENCOUNTER — TRANSCRIPTION ENCOUNTER (OUTPATIENT)
Age: 55
End: 2025-04-30

## 2025-05-02 ENCOUNTER — OUTPATIENT (OUTPATIENT)
Dept: OUTPATIENT SERVICES | Facility: HOSPITAL | Age: 55
LOS: 1 days | End: 2025-05-02
Payer: COMMERCIAL

## 2025-05-02 ENCOUNTER — APPOINTMENT (OUTPATIENT)
Dept: GASTROENTEROLOGY | Facility: GI CENTER | Age: 55
End: 2025-05-02
Payer: COMMERCIAL

## 2025-05-02 ENCOUNTER — APPOINTMENT (OUTPATIENT)
Dept: DERMATOLOGY | Facility: CLINIC | Age: 55
End: 2025-05-02

## 2025-05-02 ENCOUNTER — TRANSCRIPTION ENCOUNTER (OUTPATIENT)
Age: 55
End: 2025-05-02

## 2025-05-02 ENCOUNTER — RESULT REVIEW (OUTPATIENT)
Age: 55
End: 2025-05-02

## 2025-05-02 DIAGNOSIS — D12.3 BENIGN NEOPLASM OF TRANSVERSE COLON: ICD-10-CM

## 2025-05-02 DIAGNOSIS — K57.30 DIVERTICULOSIS OF LARGE INTESTINE W/OUT PERFORATION OR ABSCESS W/OUT BLEEDING: ICD-10-CM

## 2025-05-02 DIAGNOSIS — Z12.11 ENCOUNTER FOR SCREENING FOR MALIGNANT NEOPLASM OF COLON: ICD-10-CM

## 2025-05-02 DIAGNOSIS — D12.2 BENIGN NEOPLASM OF ASCENDING COLON: ICD-10-CM

## 2025-05-02 DIAGNOSIS — K63.5 POLYP OF COLON: ICD-10-CM

## 2025-05-02 DIAGNOSIS — K64.8 OTHER HEMORRHOIDS: ICD-10-CM

## 2025-05-02 PROCEDURE — 45380 COLONOSCOPY AND BIOPSY: CPT | Mod: 59,33

## 2025-05-02 PROCEDURE — 45385 COLONOSCOPY W/LESION REMOVAL: CPT | Mod: 33

## 2025-05-02 PROCEDURE — 45380 COLONOSCOPY AND BIOPSY: CPT | Mod: XS,PT

## 2025-05-02 PROCEDURE — 88305 TISSUE EXAM BY PATHOLOGIST: CPT

## 2025-05-02 PROCEDURE — 45385 COLONOSCOPY W/LESION REMOVAL: CPT | Mod: PT

## 2025-05-02 PROCEDURE — 88305 TISSUE EXAM BY PATHOLOGIST: CPT | Mod: 26

## 2025-05-07 LAB — SURGICAL PATHOLOGY STUDY: SIGNIFICANT CHANGE UP

## 2025-05-08 ENCOUNTER — NON-APPOINTMENT (OUTPATIENT)
Age: 55
End: 2025-05-08

## 2025-07-31 ENCOUNTER — APPOINTMENT (OUTPATIENT)
Dept: DERMATOLOGY | Facility: CLINIC | Age: 55
End: 2025-07-31
Payer: COMMERCIAL

## 2025-07-31 DIAGNOSIS — L85.3 XEROSIS CUTIS: ICD-10-CM

## 2025-07-31 DIAGNOSIS — L23.9 ALLERGIC CONTACT DERMATITIS, UNSPECIFIED CAUSE: ICD-10-CM

## 2025-07-31 DIAGNOSIS — L30.9 DERMATITIS, UNSPECIFIED: ICD-10-CM

## 2025-07-31 PROCEDURE — 99214 OFFICE O/P EST MOD 30 MIN: CPT

## (undated) DEVICE — SNARE LESIONHUNTER ROTAT NITNL COLD 10MM

## (undated) DEVICE — POLY TRAP ETRAP

## (undated) DEVICE — KIT DEFENDO 4 OLY 4 PC

## (undated) DEVICE — FORCEP RADIAL JAW 4 JUMBO W NDL 2.8MM 3.2MM 240CM ORANGE DISP